# Patient Record
Sex: MALE | Race: WHITE | NOT HISPANIC OR LATINO | Employment: FULL TIME | URBAN - METROPOLITAN AREA
[De-identification: names, ages, dates, MRNs, and addresses within clinical notes are randomized per-mention and may not be internally consistent; named-entity substitution may affect disease eponyms.]

---

## 2017-01-10 ENCOUNTER — ALLSCRIPTS OFFICE VISIT (OUTPATIENT)
Dept: OTHER | Facility: OTHER | Age: 22
End: 2017-01-10

## 2017-02-02 ENCOUNTER — ALLSCRIPTS OFFICE VISIT (OUTPATIENT)
Dept: OTHER | Facility: OTHER | Age: 22
End: 2017-02-02

## 2017-11-10 ENCOUNTER — ALLSCRIPTS OFFICE VISIT (OUTPATIENT)
Dept: OTHER | Facility: OTHER | Age: 22
End: 2017-11-10

## 2017-11-13 NOTE — PROGRESS NOTES
Assessment    1  Lipoma of torso (214 1) (D17 1)    Discussion/Summary    Lipoma of torsotreatment plan with patient -due to the discomfort coming from the lipoma patient was referred to Dr Carimna Edwards  Patient instructed to talk to surgeon concerning length of time he would be out of work if lipoma removed  instructed to call for problems or concerns in the interim  The patient was counseled regarding instructions for management,-- risk factor reductions,-- impressions  Chief Complaint  LUMP /RT SIDE RIB AREA W DISCOMFORT      History of Present Illness  HPI: 25year old male presenting with with with a right flank lump that was noticed 2 months ago  Recently the lump has been causing him discomfort at work and also at rest  Patient describes the pain as being a pressure squeezing on his ribs  Patient has tried no treatments to alleviate discomfort  Review of Systems   Constitutional: no fever or chills, feels well, no tiredness, no recent weight loss or weight gain  ENT: no complaints of earache, no loss of hearing, no nosebleeds or nasal discharge, no sore throat or hoarseness  Cardiovascular: no complaints of slow or fast heart rate, no chest pain, no palpitations, no leg claudication or lower extremity edema  Respiratory: no complaints of shortness of breath, no wheezing or cough, no dyspnea on exertion, no orthopnea or PND  Gastrointestinal: no complaints of abdominal pain, no constipation, no nausea or vomiting, no diarrhea or bloody stools  Genitourinary: no complaints of dysuria or incontinence, no hesitancy, no nocturia, no genital lesion, no inadequacy of penile erection  Musculoskeletal: myalgias  Integumentary: small lump felt on back of right flank  Neurological: no complaints of headache, no confusion, no numbness or tingling, no dizziness or fainting  Active Problems  1  Acute bronchitis (466 0) (J20 9)   2  Acute costochondritis (733 6) (M94 0)   3   Acute esophagitis (530 12) (K20 9)   4  Acute otitis externa (380 10) (H60 509)   5  Acute upper respiratory infection (465 9) (J06 9)   6  Closed fracture of fourth metatarsal bone (825 25) (S92 343A)   7  Foot injury (959 7) (S99 929A)   8  Impetigo (684) (L01 00)   9  Sore throat (462) (J02 9)   10  Umbilical hernia (295 7) (K42 9)   11  URI (upper respiratory infection) (465 9) (J06 9)    Past Medical History  1  History of Acute lymphadenitis of upper limb (683) (L04 2)   2  History of Ankle pain, unspecified laterality   3  History of Chronic Primary Lymphadenitis (289 1)   4  History of Herpes simplex type 1 infection (054 9) (B00 9)   5  History of acute pharyngitis (V12 69) (Z87 09)   6  History of acute sinusitis (V12 69) (Z87 09)   7  History of atopic dermatitis (V13 3) (Z87 2)   8  History of influenza (V12 09) (Z87 09)   9  History of streptococcal pharyngitis (V12 09) (Z87 09)   10  History of tinea corporis (V12 09) (Z86 19)   11  History of viral warts (V12 09) (Z86 19)   12  History of Impetigo (684) (L01 00)   13  History of Intercostal pain (786 59) (R07 82)   14  History of Strain of muscle, fascia, or tendon of lower back (846 9) (S39 012A)   15  History of Wrist Injury (959 3)  Active Problems And Past Medical History Reviewed: The active problems and past medical history were reviewed and updated today  Family History  Mother    1  Family history of Breast Cancer (V16 3)  Maternal Grandmother    2  Family history of Diabetes Mellitus (V18 0)  Paternal Grandfather    3  Family history of Heart Disease (V17 49)   4  Family history of Hypertension (V17 49)  Family History    5  Family history of Cancer  Family History Reviewed: The family history was reviewed and updated today         Social History   · Denied: History of Alcohol Use (History)   · Denied: History of Daily Coffee Consumption (___ Cups/Day)   · Daily Cola Consumption (___ Cans/Day)   · Daily Tea Consumption (___ Cups/Day)   · Dental care, regularly   · Denied: History of Drug use   · Full-time employment   · High school graduate   · Marital History - Single   · Multiple organ donor (V59 9) (Z52 9)   · Never A Smoker   · No living will   · Occasionally uses seat belt   · Pets / animals   · Denied: History of Power of  in existence   · Sexually active  The social history was reviewed and updated today  The social history was reviewed and is unchanged  Surgical History  1  History of Axillary Lymphadenectomy   2  History of Hernia Repair  Surgical History Reviewed: The surgical history was reviewed and updated today  Current Meds    The medication list was reviewed and updated today  Allergies  1  No Known Drug Allergies    Vitals   Recorded: [id number] 08:06AM   Temperature 97 4 F   Heart Rate 76   Respiration 18   Systolic 506   Diastolic 80   Height 5 ft 11 in   Weight 286 lb 6 oz   BMI Calculated 39 94   BSA Calculated 2 46       Physical Exam   Constitutional  General appearance: No acute distress, well appearing and well nourished  -- obese male BMI 39 9  Eyes  Conjunctiva and lids: No swelling, erythema, or discharge  Pupils and irises: Equal, round and reactive to light  Pulmonary  Respiratory effort: No increased work of breathing or signs of respiratory distress  Auscultation of lungs: Clear to auscultation, equal breath sounds bilaterally, no wheezes, no rales, no rhonci  Cardiovascular  Auscultation of heart: Normal rate and rhythm, normal S1 and S2, without murmurs  Examination of extremities for edema and/or varicosities: Normal    Abdomen  Abdomen: Non-tender, no masses  Lymphatic  Palpation of lymph nodes in neck: No lymphadenopathy  Musculoskeletal  Digits and nails: Normal without clubbing or cyanosis  Skin  Skin and subcutaneous tissue: Abnormal  -- soft mobile round mass palpated between T8-T9 posterior region  Neurologic  Reflexes: 2+ and symmetric     Psychiatric  Orientation to person, place and time: Normal    Mood and affect: Normal          Signatures   Electronically signed by : Kae Salas DO; Nov 12 2017  6:29PM EST                       (Author)

## 2017-11-16 ENCOUNTER — GENERIC CONVERSION - ENCOUNTER (OUTPATIENT)
Dept: OTHER | Facility: OTHER | Age: 22
End: 2017-11-16

## 2018-01-13 VITALS
RESPIRATION RATE: 18 BRPM | SYSTOLIC BLOOD PRESSURE: 124 MMHG | DIASTOLIC BLOOD PRESSURE: 82 MMHG | TEMPERATURE: 97 F | HEART RATE: 80 BPM | HEIGHT: 71 IN | BODY MASS INDEX: 44.1 KG/M2 | WEIGHT: 315 LBS

## 2018-01-13 VITALS
DIASTOLIC BLOOD PRESSURE: 80 MMHG | SYSTOLIC BLOOD PRESSURE: 142 MMHG | TEMPERATURE: 97.4 F | HEART RATE: 76 BPM | BODY MASS INDEX: 40.09 KG/M2 | RESPIRATION RATE: 18 BRPM | WEIGHT: 286.38 LBS | HEIGHT: 71 IN

## 2018-01-14 VITALS
BODY MASS INDEX: 44.1 KG/M2 | TEMPERATURE: 97 F | HEIGHT: 71 IN | SYSTOLIC BLOOD PRESSURE: 118 MMHG | DIASTOLIC BLOOD PRESSURE: 82 MMHG | WEIGHT: 315 LBS | HEART RATE: 84 BPM

## 2018-02-26 ENCOUNTER — OFFICE VISIT (OUTPATIENT)
Dept: URGENT CARE | Age: 23
End: 2018-02-26
Payer: COMMERCIAL

## 2018-02-26 VITALS
BODY MASS INDEX: 30.06 KG/M2 | SYSTOLIC BLOOD PRESSURE: 120 MMHG | WEIGHT: 210 LBS | DIASTOLIC BLOOD PRESSURE: 78 MMHG | HEART RATE: 110 BPM | HEIGHT: 70 IN | RESPIRATION RATE: 24 BRPM | OXYGEN SATURATION: 94 % | TEMPERATURE: 101.3 F

## 2018-02-26 DIAGNOSIS — J11.1 INFLUENZA-LIKE ILLNESS: Primary | ICD-10-CM

## 2018-02-26 PROCEDURE — 99213 OFFICE O/P EST LOW 20 MIN: CPT | Performed by: PREVENTIVE MEDICINE

## 2018-02-26 RX ORDER — OSELTAMIVIR PHOSPHATE 75 MG/1
75 CAPSULE ORAL EVERY 12 HOURS SCHEDULED
Qty: 10 CAPSULE | Refills: 0 | Status: SHIPPED | OUTPATIENT
Start: 2018-02-26 | End: 2018-03-03

## 2018-02-26 NOTE — LETTER
February 26, 2018     Patient: Wilfredo Georges   YOB: 1995   Date of Visit: 2/26/2018       To Whom It May Concern:    Recommend no work until without fever for 24 hours without having to take anti fever medication  Patient seen in office today for acute illness       Sincerely,        Shandra Khoury PA-C    CC: No Recipients

## 2018-02-26 NOTE — PATIENT INSTRUCTIONS
Influenza, Ambulatory Care   GENERAL INFORMATION:   Influenza (the flu) is an infection caused by the influenza virus  The flu is easily spread when an infected person coughs, sneezes, or has close contact with others  You may be able to spread the flu to others for 1 week or longer after signs or symptoms appear  Common symptoms include the following:   · Fever and chills    · Headaches, body aches, and muscle or joint pain    · Cough, runny nose, and sore throat    · Loss of appetite, nausea, vomiting, or diarrhea    · Tiredness    · Trouble breathing  Seek immediate care for the following symptoms:   · Dizziness    · Urinating less or not at all    · A seizure    · A headache, stiff neck, and confusion    · Trouble breathing with blue or purple lips    · New pain or pressure in your chest  Treatment for influenza  may include any of the following:  · Acetaminophen  decreases pain and fever  It is available without a doctor's order  Ask your healthcare provider how much to take and how often to take it  Follow directions  Acetaminophen can cause liver damage if not taken correctly  · NSAIDs  help decrease swelling and pain or fever  This medicine is available with or without a doctor's order  NSAIDs can cause stomach bleeding or kidney problems in certain people  If you take blood thinner medicine, always ask your healthcare provider if NSAIDs are safe for you  Always read the medicine label and follow directions  · Antivirals  are given to fight an infection caused by a virus  Manage your symptoms:   · Get more rest and sleep  to help you get better faster when you have the flu  · Drink more liquids as directed  Ask your healthcare provider how much liquid to drink each day and which liquids are best for you  Drinking liquids helps prevent dehydration  Prevent the spread of influenza:   · Wash your hands often  Use soap and water  Use gel hand cleanser when there is no soap and water available   Do not touch your eyes, nose, or mouth unless you have washed your hands first            · Cover your mouth and nose with a tissue when you sneeze or cough  Throw the tissue in the trash right away  · Clean shared items  Clean table surfaces, doorknobs, and light switches with a germ-killing   Do not share towels, silverware, or dishes with people who are sick  Wash bed sheets, towels, silverware, and dishes with soap and water  · Wear a face mask  over your mouth and nose if you have the flu or are near anyone who has the flu  Ask where to buy single-use masks  · Stay home if you are sick  Stay away from others as much as possible while you recover  · Get an influenza vaccine  to help prevent the flu  Everyone older than age 7 months should get a yearly influenza vaccine  Get the vaccine as soon as it is available, usually in October or November each year  Follow up with your healthcare provider as directed:  Write down your questions so you remember to ask them during your visits  CARE AGREEMENT:   You have the right to help plan your care  Learn about your health condition and how it may be treated  Discuss treatment options with your caregivers to decide what care you want to receive  You always have the right to refuse treatment  The above information is an  only  It is not intended as medical advice for individual conditions or treatments  Talk to your doctor, nurse or pharmacist before following any medical regimen to see if it is safe and effective for you  © 2014 [address] is for End User's use only and may not be sold, redistributed or otherwise used for commercial purposes  All illustrations and images included in CareNotes® are the copyrighted property of [employer]  or [employer]

## 2018-02-26 NOTE — PROGRESS NOTES
[facility]        NAME: Jose Walters is a 25 y o  male  : 1995    MRN: [id number]  DATE: 2018  TIME: 6:21 PM    Assessment and Plan   Influenza-like illness [R69]  1  Influenza-like illness  oseltamivir (TAMIFLU) 75 mg capsule     Patient does not wish to have influenza testing done at this time  Patient Instructions     Patient Instructions     Influenza, Ambulatory Care   GENERAL INFORMATION:   Influenza (the flu) is an infection caused by the influenza virus  The flu is easily spread when an infected person coughs, sneezes, or has close contact with others  You may be able to spread the flu to others for 1 week or longer after signs or symptoms appear  Common symptoms include the following:   · Fever and chills    · Headaches, body aches, and muscle or joint pain    · Cough, runny nose, and sore throat    · Loss of appetite, nausea, vomiting, or diarrhea    · Tiredness    · Trouble breathing  Seek immediate care for the following symptoms:   · Dizziness    · Urinating less or not at all    · A seizure    · A headache, stiff neck, and confusion    · Trouble breathing with blue or purple lips    · New pain or pressure in your chest  Treatment for influenza  may include any of the following:  · Acetaminophen  decreases pain and fever  It is available without a doctor's order  Ask your healthcare provider how much to take and how often to take it  Follow directions  Acetaminophen can cause liver damage if not taken correctly  · NSAIDs  help decrease swelling and pain or fever  This medicine is available with or without a doctor's order  NSAIDs can cause stomach bleeding or kidney problems in certain people  If you take blood thinner medicine, always ask your healthcare provider if NSAIDs are safe for you  Always read the medicine label and follow directions  · Antivirals  are given to fight an infection caused by a virus    Manage your symptoms:   · Get more rest and sleep to help you get better faster when you have the flu  · Drink more liquids as directed  Ask your healthcare provider how much liquid to drink each day and which liquids are best for you  Drinking liquids helps prevent dehydration  Prevent the spread of influenza:   · Wash your hands often  Use soap and water  Use gel hand cleanser when there is no soap and water available  Do not touch your eyes, nose, or mouth unless you have washed your hands first            · Cover your mouth and nose with a tissue when you sneeze or cough  Throw the tissue in the trash right away  · Clean shared items  Clean table surfaces, doorknobs, and light switches with a germ-killing   Do not share towels, silverware, or dishes with people who are sick  Wash bed sheets, towels, silverware, and dishes with soap and water  · Wear a face mask  over your mouth and nose if you have the flu or are near anyone who has the flu  Ask where to buy single-use masks  · Stay home if you are sick  Stay away from others as much as possible while you recover  · Get an influenza vaccine  to help prevent the flu  Everyone older than age 7 months should get a yearly influenza vaccine  Get the vaccine as soon as it is available, usually in October or November each year  Follow up with your healthcare provider as directed:  Write down your questions so you remember to ask them during your visits  CARE AGREEMENT:   You have the right to help plan your care  Learn about your health condition and how it may be treated  Discuss treatment options with your caregivers to decide what care you want to receive  You always have the right to refuse treatment  The above information is an  only  It is not intended as medical advice for individual conditions or treatments  Talk to your doctor, nurse or pharmacist before following any medical regimen to see if it is safe and effective for you    © 2014 [address] [facility] is for End User's use only and may not be sold, redistributed or otherwise used for commercial purposes  All illustrations and images included in CareNotes® are the copyrighted property of [employer]  or [address]   Chief Complaint     Chief Complaint   Patient presents with    Cough         History of Present Illness   Aisha Naqvi presents to the clinic c/o    56-year-old male with fever chills cough chest discomfort that started last night  Taking Mucinex DM for cough suppression  Review of Systems   Review of Systems   Constitutional: Positive for activity change, appetite change, chills, diaphoresis, fatigue and fever  HENT: Positive for congestion, postnasal drip, rhinorrhea and sore throat  Negative for ear pain  Eyes: Negative  Respiratory: Positive for cough, chest tightness, shortness of breath and wheezing  Cardiovascular: Negative for chest pain, palpitations and leg swelling  Gastrointestinal: Negative  Musculoskeletal: Positive for myalgias  Negative for neck stiffness  Skin: Negative for rash  Neurological: Positive for headaches  Hematological: Negative for adenopathy  Current Medications     No long-term prescriptions on file  Current Allergies     Allergies as of 02/26/2018    (No Known Allergies)            The following portions of the patient's history were reviewed and updated as appropriate: allergies, current medications, past family history, past medical history, past social history, past surgical history and problem list     Objective   /78   Pulse (!) 110   Temp (!) 101 3 °F (38 5 °C) (Temporal)   Resp (!) 24   Ht 5' 10" (1 778 m)   Wt 95 3 kg (210 lb)   SpO2 94%   BMI 30 13 kg/m²        Physical Exam     Physical Exam   Constitutional: He is oriented to person, place, and time     Well-developed well-nourished male appears acutely ill but in no acute distress   HENT:   TMs EACs are normal  Cobblestoning of the posterior pharynx without redness  Uvula is midline  Eyes:   No conjunctival pallor or scleral icterus  Neck:   Supple without adenopathy or masses  Good range of motion  No nuchal rigidity  Cardiovascular:   Sinus tachycardic without murmurs rubs or gallops   Pulmonary/Chest:   Clear to auscultation without wheezes rales or rhonchi   Neurological: He is alert and oriented to person, place, and time  Skin: Skin is warm  No rash noted  No erythema  Psychiatric: He has a normal mood and affect  Nursing note and vitals reviewed

## 2018-03-30 ENCOUNTER — TELEPHONE (OUTPATIENT)
Dept: FAMILY MEDICINE CLINIC | Facility: CLINIC | Age: 23
End: 2018-03-30

## 2018-04-09 ENCOUNTER — OFFICE VISIT (OUTPATIENT)
Dept: FAMILY MEDICINE CLINIC | Facility: CLINIC | Age: 23
End: 2018-04-09
Payer: COMMERCIAL

## 2018-04-09 VITALS
HEIGHT: 70 IN | BODY MASS INDEX: 36.42 KG/M2 | WEIGHT: 254.4 LBS | DIASTOLIC BLOOD PRESSURE: 84 MMHG | HEART RATE: 80 BPM | SYSTOLIC BLOOD PRESSURE: 122 MMHG | TEMPERATURE: 97.5 F

## 2018-04-09 DIAGNOSIS — L73.2 HIDRADENITIS SUPPURATIVA OF LEFT AXILLA: Primary | ICD-10-CM

## 2018-04-09 PROCEDURE — 99213 OFFICE O/P EST LOW 20 MIN: CPT | Performed by: FAMILY MEDICINE

## 2018-04-09 PROCEDURE — 3008F BODY MASS INDEX DOCD: CPT | Performed by: FAMILY MEDICINE

## 2018-04-09 NOTE — PROGRESS NOTES
Patient ID: Noe Dawn is a 25 y o  male  HPI: 25 y o male presents with a recurrent abscess under his left axilla  It keeps getting inflammed and subsequently draining pustular material       SUBJECTIVE    No family history on file  Social History     Social History    Marital status: Single     Spouse name: N/A    Number of children: N/A    Years of education: N/A     Occupational History    Not on file  Social History Main Topics    Smoking status: Never Smoker    Smokeless tobacco: Never Used    Alcohol use Yes      Comment: social    Drug use: No    Sexual activity: Not on file     Other Topics Concern    Not on file     Social History Narrative    No narrative on file     Past Medical History:   Diagnosis Date    Melanoma ([facility] [address] )     back     Past Surgical History:   Procedure Laterality Date    UMBILICAL HERNIA REPAIR       No Known Allergies  No current outpatient prescriptions on file      Review of Systems  Constitutional:     Denies fever, chills ,fatigue ,weakness ,weight loss, weight gain     ENT: Denies earache ,loss of hearing ,nosebleed, nasal discharge,nasal congestion ,sore throat ,hoarseness  Pulmonary: Denies shortness of breath ,cough  ,dyspnea on exertion, orthopnea  ,PND   Cardiovascular:  Denies bradycardia , tachycardia  ,palpations, lower extremity edema leg, claudication  Breast:  Denies new or changing breast lumps ,nipple discharge ,nipple changes  Abdomen:  Denies abdominal pain , anorexia , indigestion, nausea, vomiting, constipation, diarrhea  Musculoskeletal: Denies myalgias, arthralgias, joint swelling, joint stiffness , limb pain, limb swelling  Gu: denies dysuria, polyuria  Skin:Erythematous left axillary mass approx 4 cm in diameter and fluctuant  Neuro: Denies headache, numbness, tingling, confusion, loss of consciousness, dizziness, vertigo  Psychiatric: Denies feelings of depression, suicidal ideation, anxiety, sleep disturbances    OBJECTIVE    Constitutional:   NAD, well appearing and well nourished      ENT:   Conjunctiva and lids: no injection, edema, or discharge     Pupils and iris: RUI bilaterally    External inspection of ears and nose: normal without deformities or discharge  Otoscopic exam: Canals patent without erythema  Nasal mucosa, septum and turbinates: Normal or edema or discharge         Oropharynx:  Moist mucosa, normal tongue and tonsils without lesions  No erythema        Pulmonary:Respiratory effort normal rate and rhythm, no increased work of breathing  Auscultation of lungs:  Clear bilaterally with no adventitious breath sounds       Cardiovascular: regular rate and rhythm, S1 and S2, no murmur, no edema and/or varicosities of LE      Abdomen: Soft and non-distended     Positive bowel sounds      No heptomegaly or splenomegaly      Gu: no suprapubic tenderness or CVA tenderness, no urethral discharge  Lymphatic:  No anterior or posterior cervical lymphadenopathy         Musculoskeletal:  Gait and station: Normal gait      Digits and nails normal without clubbing or cyanosis       Inspection/palpation of joints, bones, and muscles:  No joint tenderness, swelling, full active and passive range of motion       Skin: Normal skin turgor and no rashes      Neuro:    Normal reflexes     Psych:   alert and oriented to person, place and time     normal mood and affect       Assessment/Plan:Diagnoses and all orders for this visit:    Hidradenitis suppurativa of left axilla  -     Ambulatory referral to General Surgery; Future      Will await general surgery evaluation

## 2019-09-18 ENCOUNTER — OFFICE VISIT (OUTPATIENT)
Dept: FAMILY MEDICINE CLINIC | Facility: CLINIC | Age: 24
End: 2019-09-18
Payer: COMMERCIAL

## 2019-09-18 VITALS
BODY MASS INDEX: 41.8 KG/M2 | SYSTOLIC BLOOD PRESSURE: 122 MMHG | HEART RATE: 72 BPM | TEMPERATURE: 98.5 F | HEIGHT: 70 IN | DIASTOLIC BLOOD PRESSURE: 70 MMHG | WEIGHT: 292 LBS

## 2019-09-18 DIAGNOSIS — R53.83 OTHER FATIGUE: Primary | ICD-10-CM

## 2019-09-18 PROCEDURE — 3008F BODY MASS INDEX DOCD: CPT | Performed by: FAMILY MEDICINE

## 2019-09-18 PROCEDURE — 99213 OFFICE O/P EST LOW 20 MIN: CPT | Performed by: FAMILY MEDICINE

## 2019-09-25 ENCOUNTER — APPOINTMENT (OUTPATIENT)
Dept: LAB | Facility: HOSPITAL | Age: 24
End: 2019-09-25
Payer: COMMERCIAL

## 2019-09-25 DIAGNOSIS — R53.83 OTHER FATIGUE: ICD-10-CM

## 2019-09-25 LAB
ALBUMIN SERPL BCP-MCNC: 4.7 G/DL (ref 3.5–5)
ALP SERPL-CCNC: 65 U/L (ref 46–116)
ALT SERPL W P-5'-P-CCNC: 71 U/L (ref 12–78)
ANION GAP SERPL CALCULATED.3IONS-SCNC: 10 MMOL/L (ref 4–13)
AST SERPL W P-5'-P-CCNC: 42 U/L (ref 5–45)
BILIRUB SERPL-MCNC: 0.8 MG/DL (ref 0.2–1)
BUN SERPL-MCNC: 16 MG/DL (ref 5–25)
CALCIUM SERPL-MCNC: 9.8 MG/DL (ref 8.3–10.1)
CHLORIDE SERPL-SCNC: 103 MMOL/L (ref 100–108)
CO2 SERPL-SCNC: 27 MMOL/L (ref 21–32)
CREAT SERPL-MCNC: 1.07 MG/DL (ref 0.6–1.3)
ERYTHROCYTE [DISTWIDTH] IN BLOOD BY AUTOMATED COUNT: 12.5 % (ref 11.6–15.1)
GFR SERPL CREATININE-BSD FRML MDRD: 97 ML/MIN/1.73SQ M
GLUCOSE SERPL-MCNC: 84 MG/DL (ref 65–140)
HCT VFR BLD AUTO: 45.7 % (ref 36.5–49.3)
HGB BLD-MCNC: 15.6 G/DL (ref 12–17)
MCH RBC QN AUTO: 30.5 PG (ref 26.8–34.3)
MCHC RBC AUTO-ENTMCNC: 34.1 G/DL (ref 31.4–37.4)
MCV RBC AUTO: 89 FL (ref 82–98)
PLATELET # BLD AUTO: 305 THOUSANDS/UL (ref 149–390)
PMV BLD AUTO: 11.6 FL (ref 8.9–12.7)
POTASSIUM SERPL-SCNC: 3.9 MMOL/L (ref 3.5–5.3)
PROT SERPL-MCNC: 8.1 G/DL (ref 6.4–8.2)
RBC # BLD AUTO: 5.11 MILLION/UL (ref 3.88–5.62)
SODIUM SERPL-SCNC: 140 MMOL/L (ref 136–145)
TSH SERPL DL<=0.05 MIU/L-ACNC: 2.74 UIU/ML (ref 0.36–3.74)
WBC # BLD AUTO: 8.6 THOUSAND/UL (ref 4.31–10.16)

## 2019-09-25 PROCEDURE — 84443 ASSAY THYROID STIM HORMONE: CPT

## 2019-09-25 PROCEDURE — 86800 THYROGLOBULIN ANTIBODY: CPT

## 2019-09-25 PROCEDURE — 36415 COLL VENOUS BLD VENIPUNCTURE: CPT

## 2019-09-25 PROCEDURE — 85027 COMPLETE CBC AUTOMATED: CPT

## 2019-09-25 PROCEDURE — 80053 COMPREHEN METABOLIC PANEL: CPT

## 2019-09-26 LAB — THYROGLOB AB SERPL-ACNC: <1 IU/ML (ref 0–0.9)

## 2019-09-30 ENCOUNTER — APPOINTMENT (OUTPATIENT)
Dept: LAB | Facility: CLINIC | Age: 24
End: 2019-09-30
Payer: COMMERCIAL

## 2019-09-30 PROCEDURE — 84402 ASSAY OF FREE TESTOSTERONE: CPT

## 2019-09-30 PROCEDURE — 84403 ASSAY OF TOTAL TESTOSTERONE: CPT

## 2019-09-30 PROCEDURE — 36415 COLL VENOUS BLD VENIPUNCTURE: CPT

## 2019-10-01 LAB
TESTOST FREE SERPL-MCNC: 23.8 PG/ML (ref 9.3–26.5)
TESTOST SERPL-MCNC: 876 NG/DL (ref 264–916)

## 2019-10-10 ENCOUNTER — TELEPHONE (OUTPATIENT)
Dept: FAMILY MEDICINE CLINIC | Facility: CLINIC | Age: 24
End: 2019-10-10

## 2019-12-20 NOTE — PROGRESS NOTES
BMI Counseling: Body mass index is 41 9 kg/m²  The BMI is above normal  Nutrition recommendations include reducing portion sizes, decreasing overall calorie intake and 3-5 servings of fruits/vegetables daily  Exercise recommendations include exercising 3-5 times per week

## 2019-12-30 ENCOUNTER — OFFICE VISIT (OUTPATIENT)
Dept: FAMILY MEDICINE CLINIC | Facility: CLINIC | Age: 24
End: 2019-12-30
Payer: COMMERCIAL

## 2019-12-30 VITALS
HEIGHT: 70 IN | DIASTOLIC BLOOD PRESSURE: 80 MMHG | WEIGHT: 291 LBS | BODY MASS INDEX: 41.66 KG/M2 | TEMPERATURE: 99.9 F | SYSTOLIC BLOOD PRESSURE: 132 MMHG | HEART RATE: 74 BPM

## 2019-12-30 DIAGNOSIS — J20.9 ACUTE BRONCHITIS, UNSPECIFIED ORGANISM: Primary | ICD-10-CM

## 2019-12-30 PROCEDURE — 99213 OFFICE O/P EST LOW 20 MIN: CPT | Performed by: FAMILY MEDICINE

## 2019-12-30 PROCEDURE — 3008F BODY MASS INDEX DOCD: CPT | Performed by: FAMILY MEDICINE

## 2019-12-30 RX ORDER — DOXYCYCLINE HYCLATE 100 MG/1
100 CAPSULE ORAL EVERY 12 HOURS SCHEDULED
Qty: 20 CAPSULE | Refills: 0 | Status: SHIPPED | OUTPATIENT
Start: 2019-12-30 | End: 2020-01-09

## 2019-12-30 RX ORDER — GUAIFENESIN AND CODEINE PHOSPHATE 100; 10 MG/5ML; MG/5ML
10 SOLUTION ORAL 3 TIMES DAILY PRN
Qty: 240 ML | Refills: 1 | Status: SHIPPED | OUTPATIENT
Start: 2019-12-30 | End: 2021-12-02 | Stop reason: ALTCHOICE

## 2019-12-30 RX ORDER — PREDNISONE 10 MG/1
TABLET ORAL
Qty: 26 TABLET | Refills: 0 | Status: SHIPPED | OUTPATIENT
Start: 2019-12-30 | End: 2020-04-13 | Stop reason: ALTCHOICE

## 2019-12-30 NOTE — LETTER
December 30, 2019     Patient: Rachael Espana   YOB: 1995   Date of Visit: 12/30/2019       To Whom it May Concern:    Gricel Sy is under my professional care  He was seen in my office on 12/30/2019  He may return to work on 12/31/19  If you have any questions or concerns, please don't hesitate to call           Sincerely,          Francesco Cortés DO        CC: No Recipients

## 2019-12-30 NOTE — LETTER
December 30, 2019     Patient: Remigio Dakin   YOB: 1995   Date of Visit: 12/30/2019       To Whom it May Concern:    Charity Fregoso is under my professional care  He was seen in my office on 12/30/2019  He may return to work on 1/2/20  If you have any questions or concerns, please don't hesitate to call           Sincerely,          Phillip Hidden, DO        CC: No Recipients

## 2019-12-31 NOTE — PROGRESS NOTES
Patient ID: Josephine Craft is a 22 y o  male  HPI: 22 y o male presenting with symptoms of chest congestion, cough and wheezing  Symptoms worsening over past 4 days        SUBJECTIVE    Family History   Problem Relation Age of Onset    Breast cancer Mother     Diabetes Maternal Grandmother     Heart disease Paternal Grandfather     Hypertension Paternal Grandfather     Cancer Family      Social History     Socioeconomic History    Marital status: Single     Spouse name: Not on file    Number of children: Not on file    Years of education: Not on file    Highest education level: Not on file   Occupational History    Not on file   Social Needs    Financial resource strain: Not on file    Food insecurity:     Worry: Not on file     Inability: Not on file    Transportation needs:     Medical: Not on file     Non-medical: Not on file   Tobacco Use    Smoking status: Never Smoker    Smokeless tobacco: Never Used   Substance and Sexual Activity    Alcohol use: Yes     Comment: social    Drug use: No    Sexual activity: Not on file   Lifestyle    Physical activity:     Days per week: Not on file     Minutes per session: Not on file    Stress: Not on file   Relationships    Social connections:     Talks on phone: Not on file     Gets together: Not on file     Attends Sabianism service: Not on file     Active member of club or organization: Not on file     Attends meetings of clubs or organizations: Not on file     Relationship status: Not on file    Intimate partner violence:     Fear of current or ex partner: Not on file     Emotionally abused: Not on file     Physically abused: Not on file     Forced sexual activity: Not on file   Other Topics Concern    Not on file   Social History Narrative    Not on file     Past Medical History:   Diagnosis Date    Herpes simplex type 1 infection     last assessed 04/07/2014    Lymphadenitis, chronic     resolved 04/07/2014    Melanoma ([facility] [address] )     back    Viral warts     last assessed 04/07/14     Past Surgical History:   Procedure Laterality Date    MASTECTOMY PARTIAL / LUMPECTOMY W/ AXILLARY LYMPHADENECTOMY      resolved 55/51/5468    UMBILICAL HERNIA REPAIR       No Known Allergies    Current Outpatient Medications:     doxycycline hyclate (VIBRAMYCIN) 100 mg capsule, Take 1 capsule (100 mg total) by mouth every 12 (twelve) hours for 10 days, Disp: 20 capsule, Rfl: 0    guaifenesin-codeine (GUAIFENESIN AC) 100-10 MG/5ML liquid, Take 10 mL by mouth 3 (three) times a day as needed for cough, Disp: 240 mL, Rfl: 1    predniSONE 10 mg tablet, 3 tabs po bid x2 days, then 2 tabs po bid x2 days, then 1 tab bid x2 days, then 1 daily until done , Disp: 26 tablet, Rfl: 0    Review of Systems    Constitutional:     Denies fever, chills, fatigue, weakness ,weight loss, weight gain     ENT: Denies earache, loss of hearing, nosebleed, nasal discharge,nasal congestion, sore throat,hoarseness  Pulmonary: Denies shortness of breath , dyspnea on exertion, orthopnea ,but complains of cough, wheezing and pnd    Cardiovascular:  Denies bradycardia , tachycardia ,palpations, lower extremity, edema leg, claudication  Breast:  Denies new or changing breast lumps,  nipple discharge, nipple changes,  Abdomen:  Denies abdominal pain , anorexia ,indigestion, nausea ,vomiting, constipation , diarrhea  Musculoskeletal: Denies myalgias, arthralgias, joint swelling, joint stiffness ,limb pain, limb swelling  Lymph: denies swollen glands  Gu: no dysuria or urinary frequency  Skin: Denies skin rash, skin lesion, skin wound, itching,dry skin  Neuro: Denies headache, numbness, tingling, confusion, loss of consciousness, dizziness ,vertigo  Psychiatric: Denies feelings of depression, suicidal ideation, anxiety, sleep disturbances    OBJECTIVE  /80   Pulse 74   Temp 99 9 °F (37 7 °C)   Ht 5' 10" (1 778 m)   Wt 132 kg (291 lb)   BMI 41 75 kg/m²   Constitutional:   NAD, well appearing and well nourished      ENT:   Conjunctiva and lids: no injection, edema, or discharge     Pupils and iris: RUI bilaterally    External inspection of ears and nose: normal without deformities or discharge  Otoscopic exam: Canals patent without erythema; tm are dull and erythematous bilaterally       Nasal mucosa, septum and turbinates: Turbinae injection with discharge   Oropharynx:  Moist mucosa, normal tongue and tonsils without lesions  Erythema and injection  of post pharynx with pnd     Pulmonary:Respiratory effort normal rate and rhythm, no increased work of breathing  Auscultation of lungs:  Scattered rhonchi and expiratory wheezes bilaterally      Cardiovascular: regular rate and rhythm, S1 and S2, no murmur, no edema and/or varicosities of LE      Abdomen: Soft and non-distended    Positive bowel sounds    No heptomegaly or splenomegaly    Gu: no suprapubic tenderness, no CVA tenderness, or urethral discharge  Lymphatic: Anterior cervical lymphadenopathy     Muscskeletal:  Gait and station: Normal gait     Digits and nails normal without clubbing or cyanosis      Inspection/palpation of joints, bones, and muscles:  No joint tenderness, swelling, full active and passive range of motion  Skin: Normal skin turgor and no rashes      Neuro:    Normal reflexes       Psych:   alert and oriented to person, place and time     normal mood and affect       Assessment/Plan:Diagnoses and all orders for this visit:    Acute bronchitis, unspecified organism  -     predniSONE 10 mg tablet; 3 tabs po bid x2 days, then 2 tabs po bid x2 days, then 1 tab bid x2 days, then 1 daily until done   -     guaifenesin-codeine (GUAIFENESIN AC) 100-10 MG/5ML liquid; Take 10 mL by mouth 3 (three) times a day as needed for cough  -     doxycycline hyclate (VIBRAMYCIN) 100 mg capsule; Take 1 capsule (100 mg total) by mouth every 12 (twelve) hours for 10 days        Reviewed with patient plan to treat with above plan      Patient instructed to call in 72 hours if not feeling better or if symptoms worsen h

## 2020-01-01 NOTE — TELEPHONE ENCOUNTER
1015 Monday am
Would like to see you or NP Monday, has pussy cyst under armpit 
positive blood return obtained via catheter/all materials/supplies accounted for at end of procedure/location identified, draped/prepped, sterile technique used, needle inserted/introduced
location identified, draped/prepped, sterile technique used/supine position/sterile dressing applied/sterile technique, catheter placed

## 2020-01-07 ENCOUNTER — TELEPHONE (OUTPATIENT)
Dept: GASTROENTEROLOGY | Facility: CLINIC | Age: 25
End: 2020-01-07

## 2020-04-13 ENCOUNTER — TELEMEDICINE (OUTPATIENT)
Dept: FAMILY MEDICINE CLINIC | Facility: CLINIC | Age: 25
End: 2020-04-13
Payer: COMMERCIAL

## 2020-04-13 ENCOUNTER — TELEPHONE (OUTPATIENT)
Dept: FAMILY MEDICINE CLINIC | Facility: CLINIC | Age: 25
End: 2020-04-13

## 2020-04-13 DIAGNOSIS — S76.112A STRAIN OF LEFT QUADRICEPS, INITIAL ENCOUNTER: Primary | ICD-10-CM

## 2020-04-13 PROCEDURE — 99213 OFFICE O/P EST LOW 20 MIN: CPT | Performed by: FAMILY MEDICINE

## 2020-04-13 PROCEDURE — 1036F TOBACCO NON-USER: CPT | Performed by: FAMILY MEDICINE

## 2020-04-13 RX ORDER — METHOCARBAMOL 500 MG/1
500 TABLET, FILM COATED ORAL 3 TIMES DAILY
Qty: 30 TABLET | Refills: 0 | Status: SHIPPED | OUTPATIENT
Start: 2020-04-13 | End: 2021-12-02 | Stop reason: ALTCHOICE

## 2021-02-11 ENCOUNTER — TELEPHONE (OUTPATIENT)
Dept: FAMILY MEDICINE CLINIC | Facility: CLINIC | Age: 26
End: 2021-02-11

## 2021-02-11 DIAGNOSIS — B35.4 TINEA CORPORIS: Primary | ICD-10-CM

## 2021-02-11 RX ORDER — KETOCONAZOLE 20 MG/G
CREAM TOPICAL DAILY
Qty: 30 G | Refills: 3 | Status: SHIPPED | OUTPATIENT
Start: 2021-02-11 | End: 2021-12-02 | Stop reason: ALTCHOICE

## 2021-02-11 NOTE — TELEPHONE ENCOUNTER
Patient called stating he needs a refill on his cream you prescribe him for his skin disease? He could not remember the name of the cream but he said you have been giving it to him for about 7 years now       James EDWARDS

## 2021-07-18 ENCOUNTER — OFFICE VISIT (OUTPATIENT)
Dept: URGENT CARE | Facility: MEDICAL CENTER | Age: 26
End: 2021-07-18
Payer: COMMERCIAL

## 2021-07-18 VITALS
OXYGEN SATURATION: 97 % | WEIGHT: 315 LBS | BODY MASS INDEX: 45.1 KG/M2 | HEIGHT: 70 IN | DIASTOLIC BLOOD PRESSURE: 79 MMHG | TEMPERATURE: 97.8 F | HEART RATE: 104 BPM | RESPIRATION RATE: 18 BRPM | SYSTOLIC BLOOD PRESSURE: 143 MMHG

## 2021-07-18 DIAGNOSIS — L05.01 PILONIDAL ABSCESS: Primary | ICD-10-CM

## 2021-07-18 PROCEDURE — 99213 OFFICE O/P EST LOW 20 MIN: CPT | Performed by: PHYSICIAN ASSISTANT

## 2021-07-18 PROCEDURE — 10080 I&D PILONIDAL CYST SIMPLE: CPT | Performed by: PHYSICIAN ASSISTANT

## 2021-07-18 RX ORDER — CEPHALEXIN 500 MG/1
500 CAPSULE ORAL EVERY 6 HOURS SCHEDULED
Qty: 28 CAPSULE | Refills: 0 | Status: SHIPPED | OUTPATIENT
Start: 2021-07-18 | End: 2021-07-25

## 2021-07-18 NOTE — PROGRESS NOTES
[facility]        NAME: Dae Damico is a 26 y o  male  : 1995    MRN: [id number]  DATE: 2021  TIME: 4:32 PM    Assessment and Plan   Pilonidal abscess [L05 01]  1  Pilonidal abscess  cephalexin (KEFLEX) 500 mg capsule         Patient Instructions     Pilonidal abscess  Keflex as directed  Return in 24 hours for packing removal  Follow up with PCP in 3-5 days  Proceed to  ER if symptoms worsen  Chief Complaint     Chief Complaint   Patient presents with    Abscess     taibone for the past 3 days  History of Present Illness       25 y/o male presents c/o pain and swelling to buttock area x 4 days  States he tried warm compresses with no improvement      Review of Systems   Review of Systems   Constitutional: Negative  HENT: Negative  Eyes: Negative  Respiratory: Negative  Negative for apnea, cough, choking, chest tightness, shortness of breath, wheezing and stridor  Cardiovascular: Negative  Negative for chest pain  Skin: Positive for wound           Current Medications       Current Outpatient Medications:     cephalexin (KEFLEX) 500 mg capsule, Take 1 capsule (500 mg total) by mouth every 6 (six) hours for 7 days, Disp: 28 capsule, Rfl: 0    guaifenesin-codeine (GUAIFENESIN AC) 100-10 MG/5ML liquid, Take 10 mL by mouth 3 (three) times a day as needed for cough (Patient not taking: Reported on 2021), Disp: 240 mL, Rfl: 1    ketoconazole (NIZORAL) 2 % cream, Apply topically daily (Patient not taking: Reported on 2021), Disp: 30 g, Rfl: 3    methocarbamol (ROBAXIN) 500 mg tablet, Take 1 tablet (500 mg total) by mouth 3 (three) times a day for 10 days, Disp: 30 tablet, Rfl: 0    oxaprozin (DAYPRO) 600 MG tablet, Take 1 tablet (600 mg total) by mouth 2 (two) times a day for 10 days, Disp: 20 tablet, Rfl: 0    Current Allergies     Allergies as of 2021    (No Known Allergies)            The following portions of the patient's history were reviewed and updated as appropriate: allergies, current medications, past family history, past medical history, past social history, past surgical history and problem list      Past Medical History:   Diagnosis Date    Herpes simplex type 1 infection     last assessed 04/07/2014    Lymphadenitis, chronic     resolved 04/07/2014    Melanoma ([facility] [address] )     back    Viral warts     last assessed 04/07/14       Past Surgical History:   Procedure Laterality Date    MASTECTOMY PARTIAL / LUMPECTOMY W/ AXILLARY LYMPHADENECTOMY      resolved 91/26/7946    UMBILICAL HERNIA REPAIR         Family History   Problem Relation Age of Onset    Breast cancer Mother     Diabetes Maternal Grandmother     Heart disease Paternal Grandfather     Hypertension Paternal Grandfather     Cancer Family          Medications have been verified  Objective   /79   Pulse 104   Temp 97 8 °F (36 6 °C)   Resp 18   Ht 5' 10" (1 778 m)   Wt (!) 143 kg (316 lb)   SpO2 97%   BMI 45 34 kg/m²        Physical Exam     Physical Exam  Constitutional:       General: He is not in acute distress  Appearance: He is well-developed  He is not diaphoretic  Cardiovascular:      Rate and Rhythm: Normal rate and regular rhythm  Heart sounds: Normal heart sounds  Pulmonary:      Effort: Pulmonary effort is normal  No respiratory distress  Breath sounds: Normal breath sounds  No wheezing or rales  Chest:      Chest wall: No tenderness  Musculoskeletal:      Cervical back: Normal range of motion and neck supple  Lymphadenopathy:      Cervical: No cervical adenopathy     Skin:                       Incision and drain    Date/Time: 7/18/2021 4:36 PM  Performed by: Jeane Lombardi PA-C  Authorized by: Jeane Lombardi PA-C   Universal Protocol:  Risks and benefits: risks, benefits and alternatives were discussed  Consent given by: patient  Patient understanding: patient states understanding of the procedure being performed  Patient identity confirmed: verbally with patient      Patient location:  Clinic  Location:     Type:  Abscess    Location:  Anogenital    Anogenital location:  Pilonidal  Pre-procedure details:     Skin preparation:  Antiseptic wash and Betadine  Anesthesia (see MAR for exact dosages): Anesthesia method:  Local infiltration    Local anesthetic:  Lidocaine 1% w/o epi  Procedure details:     Complexity:  Simple    Needle aspiration: no      Incision types:  Stab incision    Scalpel blade:  11    Approach:  Open    Incision depth:  Subcutaneous    Wound management:  Probed and deloculated, irrigated with saline, extensive cleaning and debrided    Drainage:  Purulent    Drainage amount:  Copious    Wound treatment:  Wound left open and packing placed    Packing materials:  1/4 in iodoform gauze  Post-procedure details:     Patient tolerance of procedure:   Tolerated well, no immediate complications

## 2021-07-18 NOTE — PATIENT INSTRUCTIONS
Pilonidal abscess  Keflex as directed  Return in 24 hours for packing removal  Follow up with PCP in 3-5 days  Proceed to  ER if symptoms worsenPilonidal Cyst   WHAT YOU NEED TO KNOW:   A pilonidal cyst is a small sac under the skin  Pilonidal cysts may become infected and cause an abscess (collection of pus)  DISCHARGE INSTRUCTIONS:   Contact your healthcare provider if:   · You have a fever  · Your cyst is red and swollen  · Your cyst has pus draining from it  · You have questions or concerns about your condition or care  Prevent an infection:   · Shave around the cyst   This will prevent hairs from entering the cyst  Your healthcare provider may recommend laser hair removal      · Clean the cyst area as directed  Your healthcare provider may recommend that you use a mild soap and rinse it well  · Do not sit for long periods of time  This may cause the cyst to get irritated  Follow up with your healthcare provider as directed:  Write down your questions so you remember to ask them during your visits  © Copyright [address] Information is for End User's use only and may not be sold, redistributed or otherwise used for commercial purposes  All illustrations and images included in CareNotes® are the copyrighted property of A D A M , Inc  or [address] [place] [address] José   The above information is an  only  It is not intended as medical advice for individual conditions or treatments  Talk to your doctor, nurse or pharmacist before following any medical regimen to see if it is safe and effective for you

## 2021-11-23 ENCOUNTER — TELEPHONE (OUTPATIENT)
Dept: FAMILY MEDICINE CLINIC | Facility: CLINIC | Age: 26
End: 2021-11-23

## 2021-12-02 ENCOUNTER — OFFICE VISIT (OUTPATIENT)
Dept: FAMILY MEDICINE CLINIC | Facility: CLINIC | Age: 26
End: 2021-12-02
Payer: COMMERCIAL

## 2021-12-02 VITALS
HEIGHT: 70 IN | WEIGHT: 315 LBS | SYSTOLIC BLOOD PRESSURE: 124 MMHG | HEART RATE: 82 BPM | TEMPERATURE: 98.6 F | BODY MASS INDEX: 45.1 KG/M2 | DIASTOLIC BLOOD PRESSURE: 82 MMHG

## 2021-12-02 DIAGNOSIS — Z00.00 ANNUAL PHYSICAL EXAM: Primary | ICD-10-CM

## 2021-12-02 DIAGNOSIS — G47.00 INSOMNIA, UNSPECIFIED TYPE: ICD-10-CM

## 2021-12-02 DIAGNOSIS — R53.83 OTHER FATIGUE: ICD-10-CM

## 2021-12-02 DIAGNOSIS — E78.00 HYPERCHOLESTEROLEMIA: ICD-10-CM

## 2021-12-02 DIAGNOSIS — F90.9 ATTENTION DEFICIT HYPERACTIVITY DISORDER (ADHD), UNSPECIFIED ADHD TYPE: ICD-10-CM

## 2021-12-02 PROCEDURE — 3725F SCREEN DEPRESSION PERFORMED: CPT | Performed by: FAMILY MEDICINE

## 2021-12-02 PROCEDURE — 3008F BODY MASS INDEX DOCD: CPT | Performed by: FAMILY MEDICINE

## 2021-12-02 PROCEDURE — 99214 OFFICE O/P EST MOD 30 MIN: CPT | Performed by: FAMILY MEDICINE

## 2021-12-02 PROCEDURE — 1036F TOBACCO NON-USER: CPT | Performed by: FAMILY MEDICINE

## 2021-12-02 RX ORDER — DEXTROAMPHETAMINE SACCHARATE, AMPHETAMINE ASPARTATE MONOHYDRATE, DEXTROAMPHETAMINE SULFATE AND AMPHETAMINE SULFATE 5; 5; 5; 5 MG/1; MG/1; MG/1; MG/1
20 CAPSULE, EXTENDED RELEASE ORAL EVERY MORNING
Qty: 30 CAPSULE | Refills: 0 | Status: SHIPPED | OUTPATIENT
Start: 2021-12-02 | End: 2021-12-30

## 2021-12-02 RX ORDER — ZOLPIDEM TARTRATE 12.5 MG/1
12.5 TABLET, FILM COATED, EXTENDED RELEASE ORAL
Qty: 30 TABLET | Refills: 0 | Status: SHIPPED | OUTPATIENT
Start: 2021-12-02 | End: 2021-12-30 | Stop reason: SDUPTHER

## 2021-12-03 ENCOUNTER — APPOINTMENT (OUTPATIENT)
Dept: LAB | Facility: MEDICAL CENTER | Age: 26
End: 2021-12-03
Payer: COMMERCIAL

## 2021-12-03 DIAGNOSIS — E78.00 HYPERCHOLESTEROLEMIA: ICD-10-CM

## 2021-12-03 DIAGNOSIS — R53.83 OTHER FATIGUE: ICD-10-CM

## 2021-12-03 LAB
ALBUMIN SERPL BCP-MCNC: 4.1 G/DL (ref 3.5–5)
ALP SERPL-CCNC: 51 U/L (ref 46–116)
ALT SERPL W P-5'-P-CCNC: 135 U/L (ref 12–78)
ANION GAP SERPL CALCULATED.3IONS-SCNC: 8 MMOL/L (ref 4–13)
AST SERPL W P-5'-P-CCNC: 53 U/L (ref 5–45)
BASOPHILS # BLD AUTO: 0.04 THOUSANDS/ΜL (ref 0–0.1)
BASOPHILS NFR BLD AUTO: 1 % (ref 0–1)
BILIRUB SERPL-MCNC: 0.95 MG/DL (ref 0.2–1)
BUN SERPL-MCNC: 15 MG/DL (ref 5–25)
CALCIUM SERPL-MCNC: 9.3 MG/DL (ref 8.3–10.1)
CHLORIDE SERPL-SCNC: 105 MMOL/L (ref 100–108)
CHOLEST SERPL-MCNC: 181 MG/DL
CO2 SERPL-SCNC: 25 MMOL/L (ref 21–32)
CREAT SERPL-MCNC: 1.19 MG/DL (ref 0.6–1.3)
EOSINOPHIL # BLD AUTO: 0.1 THOUSAND/ΜL (ref 0–0.61)
EOSINOPHIL NFR BLD AUTO: 2 % (ref 0–6)
ERYTHROCYTE [DISTWIDTH] IN BLOOD BY AUTOMATED COUNT: 13 % (ref 11.6–15.1)
GFR SERPL CREATININE-BSD FRML MDRD: 84 ML/MIN/1.73SQ M
GLUCOSE P FAST SERPL-MCNC: 90 MG/DL (ref 65–99)
HCT VFR BLD AUTO: 48.3 % (ref 36.5–49.3)
HDLC SERPL-MCNC: 51 MG/DL
HGB BLD-MCNC: 16.4 G/DL (ref 12–17)
IMM GRANULOCYTES # BLD AUTO: 0.02 THOUSAND/UL (ref 0–0.2)
IMM GRANULOCYTES NFR BLD AUTO: 0 % (ref 0–2)
LDLC SERPL CALC-MCNC: 116 MG/DL (ref 0–100)
LYMPHOCYTES # BLD AUTO: 1.53 THOUSANDS/ΜL (ref 0.6–4.47)
LYMPHOCYTES NFR BLD AUTO: 27 % (ref 14–44)
MCH RBC QN AUTO: 30.6 PG (ref 26.8–34.3)
MCHC RBC AUTO-ENTMCNC: 34 G/DL (ref 31.4–37.4)
MCV RBC AUTO: 90 FL (ref 82–98)
MONOCYTES # BLD AUTO: 0.58 THOUSAND/ΜL (ref 0.17–1.22)
MONOCYTES NFR BLD AUTO: 10 % (ref 4–12)
NEUTROPHILS # BLD AUTO: 3.35 THOUSANDS/ΜL (ref 1.85–7.62)
NEUTS SEG NFR BLD AUTO: 60 % (ref 43–75)
NRBC BLD AUTO-RTO: 0 /100 WBCS
PLATELET # BLD AUTO: 328 THOUSANDS/UL (ref 149–390)
PMV BLD AUTO: 10.8 FL (ref 8.9–12.7)
POTASSIUM SERPL-SCNC: 4 MMOL/L (ref 3.5–5.3)
PROT SERPL-MCNC: 7.8 G/DL (ref 6.4–8.2)
RBC # BLD AUTO: 5.36 MILLION/UL (ref 3.88–5.62)
SODIUM SERPL-SCNC: 138 MMOL/L (ref 136–145)
TRIGL SERPL-MCNC: 72 MG/DL
TSH SERPL DL<=0.05 MIU/L-ACNC: 1.58 UIU/ML (ref 0.36–3.74)
WBC # BLD AUTO: 5.62 THOUSAND/UL (ref 4.31–10.16)

## 2021-12-03 PROCEDURE — 80061 LIPID PANEL: CPT

## 2021-12-03 PROCEDURE — 36415 COLL VENOUS BLD VENIPUNCTURE: CPT

## 2021-12-03 PROCEDURE — 80053 COMPREHEN METABOLIC PANEL: CPT

## 2021-12-03 PROCEDURE — 84432 ASSAY OF THYROGLOBULIN: CPT

## 2021-12-03 PROCEDURE — 84443 ASSAY THYROID STIM HORMONE: CPT

## 2021-12-03 PROCEDURE — 86800 THYROGLOBULIN ANTIBODY: CPT

## 2021-12-03 PROCEDURE — 85025 COMPLETE CBC W/AUTO DIFF WBC: CPT

## 2021-12-04 LAB
THYROGLOB AB SERPL-ACNC: <1 IU/ML (ref 0–0.9)
THYROGLOB SERPL-MCNC: 25 NG/ML (ref 1.4–29.2)

## 2022-01-03 ENCOUNTER — TELEPHONE (OUTPATIENT)
Dept: FAMILY MEDICINE CLINIC | Facility: CLINIC | Age: 27
End: 2022-01-03

## 2022-01-03 NOTE — TELEPHONE ENCOUNTER
----- Message from Jh Pastor sent at 12/27/2021  2:06 PM EST -----  Regarding: FW: How its going    ----- Message -----  From: Kamini Graham  Sent: 12/27/2021   2:04 PM EST  To: [id number] [facility]  Subject: How its going                                   Okay, I will schedule that  Do I have to  any paper work for other tests? I dont know what she will want to do about the adderall and sleeping pills  I still am not sleeping well, and the adderall just does not seem to last long enough for the day  I do see a big difference when it does work

## 2022-01-06 NOTE — TELEPHONE ENCOUNTER
Pt called back today because he saw we had tried to contact him on Monday  Asking if he can have a virtual appt or walk in appt to see you to discuss his meds and some other issues he is having

## 2022-01-07 ENCOUNTER — TELEMEDICINE (OUTPATIENT)
Dept: FAMILY MEDICINE CLINIC | Facility: CLINIC | Age: 27
End: 2022-01-07
Payer: COMMERCIAL

## 2022-01-07 DIAGNOSIS — G47.00 INSOMNIA, UNSPECIFIED TYPE: ICD-10-CM

## 2022-01-07 DIAGNOSIS — F90.9 ATTENTION DEFICIT HYPERACTIVITY DISORDER (ADHD), UNSPECIFIED ADHD TYPE: Primary | ICD-10-CM

## 2022-01-07 PROCEDURE — 99213 OFFICE O/P EST LOW 20 MIN: CPT | Performed by: FAMILY MEDICINE

## 2022-01-07 PROCEDURE — 1036F TOBACCO NON-USER: CPT | Performed by: FAMILY MEDICINE

## 2022-01-07 RX ORDER — DEXTROAMPHETAMINE SACCHARATE, AMPHETAMINE ASPARTATE, DEXTROAMPHETAMINE SULFATE AND AMPHETAMINE SULFATE 2.5; 2.5; 2.5; 2.5 MG/1; MG/1; MG/1; MG/1
10 TABLET ORAL
Qty: 30 TABLET | Refills: 0 | Status: SHIPPED | OUTPATIENT
Start: 2022-01-07 | End: 2022-01-14

## 2022-01-07 RX ORDER — TEMAZEPAM 15 MG/1
15 CAPSULE ORAL
Qty: 10 CAPSULE | Refills: 0 | Status: SHIPPED | OUTPATIENT
Start: 2022-01-07

## 2022-01-12 NOTE — PROGRESS NOTES
Virtual Regular Visit    Verification of patient location:    Patient is located in the following state in which I hold an active license [place]      Assessment/Plan:    Problem List Items Addressed This Visit     None      Visit Diagnoses     Attention deficit hyperactivity disorder (ADHD), unspecified ADHD type    -  Primary    Relevant Medications    amphetamine-dextroamphetamine (ADDERALL, 10MG,) 10 mg tablet    temazepam (RESTORIL) 15 mg capsule    Insomnia, unspecified type        Relevant Medications    temazepam (RESTORIL) 15 mg capsule               Reason for visit is No chief complaint on file  Encounter provider Maribell Carrasco DO    Provider located at [address]      Recent Visits  Date Type Provider Dept   01/07/22 [address] recent visits within past 7 days and meeting all other requirements  Future Appointments  No visits were found meeting these conditions  Showing future appointments within next 150 days and meeting all other requirements       The patient was identified by name and date of birth  Joaquim Aldrich was informed that this is a telemedicine visit and that the visit is being conducted through [address] and patient was informed this is a secure, HIPAA-complaint platform  He agrees to proceed     My office door was closed  No one else was in the room  He acknowledged consent and understanding of privacy and security of the video platform  The patient has agreed to participate and understands they can discontinue the visit at any time  Patient is aware this is a billable service  Buzz Long is a 23 y o  male is attempting to get his ADD under control  He states med wears off around 2 pm    Up until that point his focus is good  He c/o insomnia and did not respond to [employer]    He has trouble falling and staying asleep  HPI     Past Medical History:   Diagnosis Date    Herpes simplex type 1 infection     last assessed 04/07/2014    Lymphadenitis, chronic     resolved 04/07/2014    Melanoma ([facility] [address] )     back    Viral warts     last assessed 04/07/14       Past Surgical History:   Procedure Laterality Date    MASTECTOMY PARTIAL / LUMPECTOMY W/ AXILLARY LYMPHADENECTOMY      resolved 48/95/0153    UMBILICAL HERNIA REPAIR         Current Outpatient Medications   Medication Sig Dispense Refill    amphetamine-dextroamphetamine (ADDERALL XR, 30MG,) 30 MG 24 hr capsule Take 1 capsule (30 mg total) by mouth every morning Max Daily Amount: 30 mg 30 capsule 0    amphetamine-dextroamphetamine (ADDERALL, 10MG,) 10 mg tablet Take 1 tablet (10 mg total) by mouth 2 (two) times a day Max Daily Amount: 20 mg 30 tablet 0    temazepam (RESTORIL) 15 mg capsule Take 1 capsule (15 mg total) by mouth daily at bedtime as needed for sleep 10 capsule 0     No current facility-administered medications for this visit  No Known Allergies    Review of Systems   Constitutional: Positive for fatigue  Negative for activity change  Psychiatric/Behavioral: Positive for decreased concentration and sleep disturbance  Negative for agitation, behavioral problems, confusion, dysphoric mood, hallucinations, self-injury and suicidal ideas  The patient is not nervous/anxious and is not hyperactive          + trouble completing tasks   All other systems reviewed and are negative  Video Exam    There were no vitals filed for this visit  Physical Exam  Constitutional:       General: He is not in acute distress  Appearance: Normal appearance  He is not ill-appearing  HENT:      Head: Normocephalic  Eyes:      General:         Right eye: No discharge  Left eye: No discharge  Pulmonary:      Effort: No respiratory distress  Skin:     Findings: No rash  Neurological:      Mental Status: He is alert   Mental status is at baseline  Psychiatric:         Mood and Affect: Mood normal          Behavior: Behavior normal          Thought Content: Thought content normal          Judgment: Judgment normal           I spent 15 minutes directly with the patient during this visit    VIRTUAL VISIT [address] verbally agrees to participate in [employer]  Pt is aware that [employer] could be limited without vital signs or the ability to perform a full hands-on physical Trungadriana Abdi understands he or the provider may request at any time to terminate the video visit and request the patient to seek care or treatment in person

## 2022-01-14 DIAGNOSIS — G47.00 INSOMNIA, UNSPECIFIED TYPE: ICD-10-CM

## 2022-01-14 DIAGNOSIS — F98.8 ATTENTION DEFICIT DISORDER, UNSPECIFIED HYPERACTIVITY PRESENCE: Primary | ICD-10-CM

## 2022-01-14 RX ORDER — ESZOPICLONE 3 MG/1
3 TABLET, FILM COATED ORAL
Qty: 10 TABLET | Refills: 1 | Status: SHIPPED | OUTPATIENT
Start: 2022-01-14

## 2022-01-14 RX ORDER — DEXTROAMPHETAMINE SACCHARATE, AMPHETAMINE ASPARTATE, DEXTROAMPHETAMINE SULFATE AND AMPHETAMINE SULFATE 5; 5; 5; 5 MG/1; MG/1; MG/1; MG/1
TABLET ORAL
Qty: 30 TABLET | Refills: 0 | Status: SHIPPED | OUTPATIENT
Start: 2022-01-14 | End: 2022-01-31 | Stop reason: SDUPTHER

## 2022-01-31 DIAGNOSIS — F90.9 ATTENTION DEFICIT HYPERACTIVITY DISORDER (ADHD), UNSPECIFIED ADHD TYPE: ICD-10-CM

## 2022-01-31 DIAGNOSIS — F98.8 ATTENTION DEFICIT DISORDER, UNSPECIFIED HYPERACTIVITY PRESENCE: ICD-10-CM

## 2022-01-31 RX ORDER — DEXTROAMPHETAMINE SACCHARATE, AMPHETAMINE ASPARTATE, DEXTROAMPHETAMINE SULFATE AND AMPHETAMINE SULFATE 5; 5; 5; 5 MG/1; MG/1; MG/1; MG/1
TABLET ORAL
Qty: 30 TABLET | Refills: 0 | Status: SHIPPED | OUTPATIENT
Start: 2022-01-31 | End: 2022-03-28 | Stop reason: SDUPTHER

## 2022-01-31 RX ORDER — DEXTROAMPHETAMINE SACCHARATE, AMPHETAMINE ASPARTATE MONOHYDRATE, DEXTROAMPHETAMINE SULFATE AND AMPHETAMINE SULFATE 7.5; 7.5; 7.5; 7.5 MG/1; MG/1; MG/1; MG/1
30 CAPSULE, EXTENDED RELEASE ORAL EVERY MORNING
Qty: 30 CAPSULE | Refills: 0 | Status: SHIPPED | OUTPATIENT
Start: 2022-01-31 | End: 2022-03-28 | Stop reason: SDUPTHER

## 2022-01-31 NOTE — TELEPHONE ENCOUNTER
01/14/2022  2  01/14/2022  DEXTROAMP-AMPHETAMIN 20 MG TAB  30 0  30  [facility]  [id number]  [place] (2423)  0   [place]    01/08/2022  2  01/07/2022  TEMAZEPAM 15 MG CAPSULE  10 0  10  [facility]  [id number]  [place] (2423)  0   [place]    01/08/2022  2  01/07/2022  DEXTROAMP-AMPHETAMIN 10 MG TAB  30 0  15  [facility]  [id number]  [facility] (2423)  0   [place]

## 2022-05-21 DIAGNOSIS — F90.9 ATTENTION DEFICIT HYPERACTIVITY DISORDER (ADHD), UNSPECIFIED ADHD TYPE: ICD-10-CM

## 2022-05-21 DIAGNOSIS — F98.8 ATTENTION DEFICIT DISORDER, UNSPECIFIED HYPERACTIVITY PRESENCE: ICD-10-CM

## 2022-05-23 NOTE — TELEPHONE ENCOUNTER
1  [id number] 03/28/2022 03/28/2022 Amphetamine Salt Combo (Tablet)  30 0 30 20 MG NA CECIL BENNETT  [facility], L [facility]UL[facility] [employer] 00 / 0 [place]    1  [id number] 01/31/2022 01/31/2022 Mixed Amphetamine Salts (Capsule, Extended Release)  30 0 30 30 MG NA RADHA ESCOBAR  [facility], L [facility] [employer] 00 / 0 [place]    1  [id number] 01/14/2022 01/14/2022 Amphetamine Salt Combo (Tablet)  30 0 30 20 MG [facility] [employer] 00 / 0 [place]

## 2022-05-24 RX ORDER — DEXTROAMPHETAMINE SACCHARATE, AMPHETAMINE ASPARTATE, DEXTROAMPHETAMINE SULFATE AND AMPHETAMINE SULFATE 5; 5; 5; 5 MG/1; MG/1; MG/1; MG/1
TABLET ORAL
Qty: 30 TABLET | Refills: 0 | Status: SHIPPED | OUTPATIENT
Start: 2022-05-24 | End: 2022-06-22 | Stop reason: SDUPTHER

## 2022-05-24 RX ORDER — DEXTROAMPHETAMINE SACCHARATE, AMPHETAMINE ASPARTATE MONOHYDRATE, DEXTROAMPHETAMINE SULFATE AND AMPHETAMINE SULFATE 7.5; 7.5; 7.5; 7.5 MG/1; MG/1; MG/1; MG/1
30 CAPSULE, EXTENDED RELEASE ORAL EVERY MORNING
Qty: 30 CAPSULE | Refills: 0 | Status: SHIPPED | OUTPATIENT
Start: 2022-05-24 | End: 2022-06-22 | Stop reason: SDUPTHER

## 2022-06-22 DIAGNOSIS — F98.8 ATTENTION DEFICIT DISORDER, UNSPECIFIED HYPERACTIVITY PRESENCE: ICD-10-CM

## 2022-06-22 DIAGNOSIS — F90.9 ATTENTION DEFICIT HYPERACTIVITY DISORDER (ADHD), UNSPECIFIED ADHD TYPE: ICD-10-CM

## 2022-06-23 RX ORDER — DEXTROAMPHETAMINE SACCHARATE, AMPHETAMINE ASPARTATE MONOHYDRATE, DEXTROAMPHETAMINE SULFATE AND AMPHETAMINE SULFATE 7.5; 7.5; 7.5; 7.5 MG/1; MG/1; MG/1; MG/1
30 CAPSULE, EXTENDED RELEASE ORAL EVERY MORNING
Qty: 30 CAPSULE | Refills: 0 | Status: SHIPPED | OUTPATIENT
Start: 2022-06-23 | End: 2022-07-22 | Stop reason: SDUPTHER

## 2022-06-23 RX ORDER — DEXTROAMPHETAMINE SACCHARATE, AMPHETAMINE ASPARTATE, DEXTROAMPHETAMINE SULFATE AND AMPHETAMINE SULFATE 5; 5; 5; 5 MG/1; MG/1; MG/1; MG/1
TABLET ORAL
Qty: 30 TABLET | Refills: 0 | Status: SHIPPED | OUTPATIENT
Start: 2022-06-23 | End: 2022-07-22 | Stop reason: SDUPTHER

## 2022-06-23 NOTE — TELEPHONE ENCOUNTER
05/24/2022 05/24/2022 Amphetamine Salt Combo (Tablet)  30 0 30 20 MG NA RADHA SHAWN  [facility], OBDULIO [facility]  0 / 0 [place]    1  [id number] 05/24/2022 05/24/2022 Mixed Amphetamine Salts (Capsule, Extended Release)  30 0 30 30 MG NA RADHA

## 2022-07-20 ENCOUNTER — APPOINTMENT (OUTPATIENT)
Dept: URGENT CARE | Age: 27
End: 2022-07-20
Payer: OTHER MISCELLANEOUS

## 2022-07-20 PROCEDURE — 99283 EMERGENCY DEPT VISIT LOW MDM: CPT | Performed by: STUDENT IN AN ORGANIZED HEALTH CARE EDUCATION/TRAINING PROGRAM

## 2022-07-20 PROCEDURE — G0382 LEV 3 HOSP TYPE B ED VISIT: HCPCS | Performed by: STUDENT IN AN ORGANIZED HEALTH CARE EDUCATION/TRAINING PROGRAM

## 2022-07-22 DIAGNOSIS — F90.9 ATTENTION DEFICIT HYPERACTIVITY DISORDER (ADHD), UNSPECIFIED ADHD TYPE: ICD-10-CM

## 2022-07-22 DIAGNOSIS — F98.8 ATTENTION DEFICIT DISORDER, UNSPECIFIED HYPERACTIVITY PRESENCE: ICD-10-CM

## 2022-07-22 RX ORDER — DEXTROAMPHETAMINE SACCHARATE, AMPHETAMINE ASPARTATE, DEXTROAMPHETAMINE SULFATE AND AMPHETAMINE SULFATE 5; 5; 5; 5 MG/1; MG/1; MG/1; MG/1
TABLET ORAL
Qty: 30 TABLET | Refills: 0 | Status: SHIPPED | OUTPATIENT
Start: 2022-07-22 | End: 2022-08-23 | Stop reason: SDUPTHER

## 2022-07-22 RX ORDER — DEXTROAMPHETAMINE SACCHARATE, AMPHETAMINE ASPARTATE MONOHYDRATE, DEXTROAMPHETAMINE SULFATE AND AMPHETAMINE SULFATE 7.5; 7.5; 7.5; 7.5 MG/1; MG/1; MG/1; MG/1
30 CAPSULE, EXTENDED RELEASE ORAL EVERY MORNING
Qty: 30 CAPSULE | Refills: 0 | Status: SHIPPED | OUTPATIENT
Start: 2022-07-22 | End: 2022-08-23 | Stop reason: SDUPTHER

## 2022-07-22 NOTE — TELEPHONE ENCOUNTER
1 [id number] 06/23/2022 06/23/2022 Amphetamine Salt Combo (Tablet) 30 0 30 20 MG NA RADHA ESCOBAR [facility], [facility]ULIO [facility] 0 / 0 [place]    1 [id number] 06/23/2022 06/23/2022 Mixed Amphetamine Salt (Capsule, Extended Release) 30 0 30 30 MG NA RADHA [facility], [facility] 0 / 0 [place]

## 2022-08-02 ENCOUNTER — APPOINTMENT (OUTPATIENT)
Dept: URGENT CARE | Age: 27
End: 2022-08-02
Payer: OTHER MISCELLANEOUS

## 2022-08-02 PROCEDURE — 99213 OFFICE O/P EST LOW 20 MIN: CPT | Performed by: STUDENT IN AN ORGANIZED HEALTH CARE EDUCATION/TRAINING PROGRAM

## 2022-08-23 DIAGNOSIS — F90.9 ATTENTION DEFICIT HYPERACTIVITY DISORDER (ADHD), UNSPECIFIED ADHD TYPE: ICD-10-CM

## 2022-08-23 DIAGNOSIS — F98.8 ATTENTION DEFICIT DISORDER, UNSPECIFIED HYPERACTIVITY PRESENCE: ICD-10-CM

## 2022-08-23 RX ORDER — DEXTROAMPHETAMINE SACCHARATE, AMPHETAMINE ASPARTATE, DEXTROAMPHETAMINE SULFATE AND AMPHETAMINE SULFATE 5; 5; 5; 5 MG/1; MG/1; MG/1; MG/1
TABLET ORAL
Qty: 30 TABLET | Refills: 0 | Status: SHIPPED | OUTPATIENT
Start: 2022-08-23 | End: 2022-09-22 | Stop reason: SDUPTHER

## 2022-08-23 RX ORDER — DEXTROAMPHETAMINE SACCHARATE, AMPHETAMINE ASPARTATE MONOHYDRATE, DEXTROAMPHETAMINE SULFATE AND AMPHETAMINE SULFATE 7.5; 7.5; 7.5; 7.5 MG/1; MG/1; MG/1; MG/1
30 CAPSULE, EXTENDED RELEASE ORAL EVERY MORNING
Qty: 30 CAPSULE | Refills: 0 | Status: SHIPPED | OUTPATIENT
Start: 2022-08-23 | End: 2022-09-22 | Stop reason: SDUPTHER

## 2022-08-23 NOTE — TELEPHONE ENCOUNTER
1  [id number] 08/11/2022 08/11/2022 oxyCODONE HCL-ACETAMINOPHEN (Tablet)  20 0 4 325 MG-5 MG 37 50 [place]ACINTOLYDIA [place]  [facility], OBDULIO GAGNON  [employer] 0 / 0 [address]    1  [id number] 07/22/2022 07/22/2022 Amphetamine Salt Combo (Tablet)  30 0 30 20 MG NA RADHA [facility], OBDULIO [facility] Us 0 / 0 [address]    1  [id number] 07/22/2022 07/22/2022 Dextroamph Sacc-amph Asp-dextroam S (Capsule, Extended Release)  30 0 30 30 MG NA RADHA [facility], OBDULIO [facility] 0 / 0 [place]

## 2022-08-24 ENCOUNTER — TELEPHONE (OUTPATIENT)
Dept: FAMILY MEDICINE CLINIC | Facility: CLINIC | Age: 27
End: 2022-08-24

## 2022-08-24 NOTE — TELEPHONE ENCOUNTER
Patient called stating he has an abscess that is starting to form above his tailbone  He is asking for an appointment with any provider to have it looked at

## 2022-08-28 ENCOUNTER — OFFICE VISIT (OUTPATIENT)
Dept: URGENT CARE | Facility: MEDICAL CENTER | Age: 27
End: 2022-08-28
Payer: COMMERCIAL

## 2022-08-28 VITALS
WEIGHT: 315 LBS | HEART RATE: 104 BPM | RESPIRATION RATE: 16 BRPM | TEMPERATURE: 97.9 F | BODY MASS INDEX: 53.95 KG/M2 | DIASTOLIC BLOOD PRESSURE: 80 MMHG | SYSTOLIC BLOOD PRESSURE: 114 MMHG | OXYGEN SATURATION: 97 %

## 2022-08-28 DIAGNOSIS — L05.01 PILONIDAL ABSCESS: Primary | ICD-10-CM

## 2022-08-28 PROCEDURE — 10080 I&D PILONIDAL CYST SIMPLE: CPT | Performed by: PHYSICIAN ASSISTANT

## 2022-08-28 PROCEDURE — 10060 I&D ABSCESS SIMPLE/SINGLE: CPT | Performed by: PHYSICIAN ASSISTANT

## 2022-08-28 PROCEDURE — 99213 OFFICE O/P EST LOW 20 MIN: CPT | Performed by: PHYSICIAN ASSISTANT

## 2022-08-28 RX ORDER — SULFAMETHOXAZOLE AND TRIMETHOPRIM 800; 160 MG/1; MG/1
1 TABLET ORAL EVERY 12 HOURS SCHEDULED
Qty: 14 TABLET | Refills: 0 | Status: SHIPPED | OUTPATIENT
Start: 2022-08-28 | End: 2022-09-04

## 2022-08-28 NOTE — PATIENT INSTRUCTIONS
1  Keep skin clean and dry  2  Motrin as needed for discomfort  3 Return for packing removal in 2 days  4  Watch for S&s of infection (redness, swelling, drainage, fever)   5   Take Bactrim DS 1 tablet twice daily x 7 days

## 2022-08-28 NOTE — PROGRESS NOTES
[address] [employer] [facility]        NAME: Kamini Graham is a 23 y o  male  : 1995    MRN: [id number]  DATE: 2022  TIME: 9:37 AM    Assessment and Plan   Pilonidal abscess [L05 01]  1  Pilonidal abscess  Incision and drain    sulfamethoxazole-trimethoprim (BACTRIM DS) 800-160 mg per tablet         Patient Instructions     1  Keep skin clean and dry  2  Motrin as needed for discomfort  3 Return for packing removal in 2 days  4  Watch for S&s of infection (redness, swelling, drainage, fever)   5  Take Bactrim DS 1 tablet twice daily x 7 days      Chief Complaint     Chief Complaint   Patient presents with    Mass     Tailbone x 3 days ago  Painful  No fever  History of Present Illness       Vita Alford is a 67-year-old male presents with a painful lesion on his sacral area that he noticed 2 days prior  Patient reports started with a small pimple but increased in size in severity over the past 2 days  Patient denies any discharge reports he has a history of pilonidal abscess  Review of Systems   Review of Systems   Constitutional: Negative  HENT: Negative  Musculoskeletal: Negative  Skin: Positive for color change and wound           Current Medications       Current Outpatient Medications:     amphetamine-dextroamphetamine (ADDERALL XR, 30MG,) 30 MG 24 hr capsule, Take 1 capsule (30 mg total) by mouth every morning Max Daily Amount: 30 mg, Disp: 30 capsule, Rfl: 0    amphetamine-dextroamphetamine (ADDERALL, 20MG,) 20 mg tablet, Take one tab in afternoon for breakthrough symtpoms, Disp: 30 tablet, Rfl: 0    sulfamethoxazole-trimethoprim (BACTRIM DS) 800-160 mg per tablet, Take 1 tablet by mouth every 12 (twelve) hours for 7 days, Disp: 14 tablet, Rfl: 0    eszopiclone (LUNESTA) 3 MG tablet, Take 1 tablet (3 mg total) by mouth daily at bedtime as needed for sleep Take immediately before bedtime (Patient not taking: Reported on 2022), Disp: 10 tablet, Rfl: 1    temazepam (RESTORIL) 15 mg capsule, Take 1 capsule (15 mg total) by mouth daily at bedtime as needed for sleep (Patient not taking: Reported on 8/28/2022), Disp: 10 capsule, Rfl: 0    Current Allergies     Allergies as of 08/28/2022    (No Known Allergies)            The following portions of the patient's history were reviewed and updated as appropriate: allergies, current medications, past family history, past medical history, past social history, past surgical history and problem list      Past Medical History:   Diagnosis Date    Herpes simplex type 1 infection     last assessed 04/07/2014    Lymphadenitis, chronic     resolved 04/07/2014    Melanoma ([facility] [address] )     back    Viral warts     last assessed 04/07/14       Past Surgical History:   Procedure Laterality Date    MASTECTOMY PARTIAL / LUMPECTOMY W/ AXILLARY LYMPHADENECTOMY      resolved 06/04/3425    UMBILICAL HERNIA REPAIR         Family History   Problem Relation Age of Onset    Breast cancer Mother     Diabetes Maternal Grandmother     Heart disease Paternal Grandfather     Hypertension Paternal Grandfather     Cancer Family          Medications have been verified  Objective   /80   Pulse 104   Temp 97 9 °F (36 6 °C)   Resp 16   Wt (!) 171 kg (376 lb)   SpO2 97%   BMI 53 95 kg/m²   No LMP for male patient  Physical Exam     Physical Exam  Constitutional:       General: He is not in acute distress  Appearance: Normal appearance  He is not ill-appearing  Cardiovascular:      Rate and Rhythm: Normal rate and regular rhythm  Heart sounds: Normal heart sounds  No murmur heard  Pulmonary:      Effort: Pulmonary effort is normal       Breath sounds: Normal breath sounds  Skin:     Comments: Two 3 cm abscess noted on the pilonidal region  Neurological:      Mental Status: He is alert         Incision and drain    Date/Time: 8/28/2022 9:34 AM  Performed by: Rickey Masters PA-C  Authorized by: Rickey Masters PA-C   Universal Protocol:  Consent given by: patient  Patient understanding: patient states understanding of the procedure being performed  Patient consent: the patient's understanding of the procedure matches consent given      Location:     Type:  Abscess    Location:  Anogenital    Anogenital location:  Pilonidal  Pre-procedure details:     Skin preparation:  Antiseptic wash and Betadine  Procedure details:     Complexity:  Simple    Incision types:  Stab incision    Scalpel blade:  11    Incision depth:  Subcutaneous    Wound management:  Probed and deloculated and extensive cleaning    Drainage:  Purulent and bloody    Drainage amount: Moderate    Packing materials:  1/4 in iodoform gauze    Amount 1/4" iodoform:  5 inches  Post-procedure details:     Patient tolerance of procedure:   Tolerated well, no immediate complications

## 2022-09-22 DIAGNOSIS — F90.9 ATTENTION DEFICIT HYPERACTIVITY DISORDER (ADHD), UNSPECIFIED ADHD TYPE: ICD-10-CM

## 2022-09-22 DIAGNOSIS — F98.8 ATTENTION DEFICIT DISORDER, UNSPECIFIED HYPERACTIVITY PRESENCE: ICD-10-CM

## 2022-09-22 RX ORDER — DEXTROAMPHETAMINE SACCHARATE, AMPHETAMINE ASPARTATE, DEXTROAMPHETAMINE SULFATE AND AMPHETAMINE SULFATE 5; 5; 5; 5 MG/1; MG/1; MG/1; MG/1
TABLET ORAL
Qty: 30 TABLET | Refills: 0 | Status: SHIPPED | OUTPATIENT
Start: 2022-09-22 | End: 2022-10-24 | Stop reason: SDUPTHER

## 2022-09-22 RX ORDER — DEXTROAMPHETAMINE SACCHARATE, AMPHETAMINE ASPARTATE MONOHYDRATE, DEXTROAMPHETAMINE SULFATE AND AMPHETAMINE SULFATE 7.5; 7.5; 7.5; 7.5 MG/1; MG/1; MG/1; MG/1
30 CAPSULE, EXTENDED RELEASE ORAL EVERY MORNING
Qty: 30 CAPSULE | Refills: 0 | Status: SHIPPED | OUTPATIENT
Start: 2022-09-22 | End: 2022-10-24 | Stop reason: SDUPTHER

## 2022-09-22 NOTE — TELEPHONE ENCOUNTER
1  [id number] 08/23/2022 08/23/2022 Amphetamine Salt Combo (Tablet)  30 0 30 20 MG NA [facility] 0 / 0 [place]    1  [id number] 08/23/2022 08/23/2022 Dextroamph Sacc-amph Asp-dextroam S (Capsule, Extended Release)  30 0 30 30 MG NA [facility] 0 / 0 [place]    1  [id number] 08/11/2022 08/11/2022 oxyCODONE HCL-ACETAMINOPHEN (Tablet)  20 0 4 325 MG-5 MG 37 50 [place]ACINTO [place]  [facility]    [employer] [place] / 0 [place]

## 2022-10-10 ENCOUNTER — TELEPHONE (OUTPATIENT)
Dept: FAMILY MEDICINE CLINIC | Facility: CLINIC | Age: 27
End: 2022-10-10

## 2022-10-10 NOTE — TELEPHONE ENCOUNTER
Patient called stating that he has developed a sty on his left eye and a cyst under his left armpit  He states that he has discomfort with the cyst under his arm  He is asking for an appointment with PCP only

## 2022-10-11 ENCOUNTER — OFFICE VISIT (OUTPATIENT)
Dept: FAMILY MEDICINE CLINIC | Facility: CLINIC | Age: 27
End: 2022-10-11
Payer: COMMERCIAL

## 2022-10-11 VITALS
OXYGEN SATURATION: 97 % | BODY MASS INDEX: 45.1 KG/M2 | HEIGHT: 70 IN | SYSTOLIC BLOOD PRESSURE: 128 MMHG | TEMPERATURE: 97.2 F | WEIGHT: 315 LBS | DIASTOLIC BLOOD PRESSURE: 72 MMHG | RESPIRATION RATE: 16 BRPM | HEART RATE: 82 BPM

## 2022-10-11 DIAGNOSIS — L04.9 SUPPURATIVE LYMPHADENITIS: ICD-10-CM

## 2022-10-11 DIAGNOSIS — H00.15 CHALAZION OF LEFT LOWER EYELID: Primary | ICD-10-CM

## 2022-10-11 DIAGNOSIS — E55.9 VITAMIN D DEFICIENCY: ICD-10-CM

## 2022-10-11 DIAGNOSIS — R53.83 OTHER FATIGUE: ICD-10-CM

## 2022-10-11 DIAGNOSIS — R73.9 HYPERGLYCEMIA: ICD-10-CM

## 2022-10-11 DIAGNOSIS — Z82.49 FAMILY HISTORY OF HEART DISEASE: ICD-10-CM

## 2022-10-11 DIAGNOSIS — N40.0 BENIGN PROSTATIC HYPERPLASIA WITHOUT LOWER URINARY TRACT SYMPTOMS: ICD-10-CM

## 2022-10-11 DIAGNOSIS — E78.00 HYPERCHOLESTEROLEMIA: ICD-10-CM

## 2022-10-11 DIAGNOSIS — N62 GYNECOMASTIA: ICD-10-CM

## 2022-10-11 DIAGNOSIS — R63.5 WEIGHT GAIN: ICD-10-CM

## 2022-10-11 PROCEDURE — 99214 OFFICE O/P EST MOD 30 MIN: CPT | Performed by: FAMILY MEDICINE

## 2022-10-11 RX ORDER — CEPHALEXIN 500 MG/1
500 CAPSULE ORAL EVERY 8 HOURS SCHEDULED
Qty: 30 CAPSULE | Refills: 0 | Status: SHIPPED | OUTPATIENT
Start: 2022-10-11 | End: 2022-10-21

## 2022-10-11 RX ORDER — CEPHALEXIN 500 MG/1
500 CAPSULE ORAL EVERY 8 HOURS SCHEDULED
Qty: 30 CAPSULE | Refills: 0 | Status: SHIPPED | OUTPATIENT
Start: 2022-10-11 | End: 2022-10-11 | Stop reason: CLARIF

## 2022-10-13 NOTE — PROGRESS NOTES
Patient ID: Evi Welch is a 33 y o  male  HPI: 33 y o male presents for a chalazion of his left right lower eyelid  He also is having a flair up of supprative lymphadenitis of his left axilla  He has also gained more than 70 pounds and is seeing a nutrionist and health  who is requesting some labs  Pt has had a history of elevated gluocse, gynecomastia  There is a family history of heart disease as well  Pt complains of fatigue      SUBJECTIVE    Family History   Problem Relation Age of Onset   • Breast cancer Mother    • Diabetes Maternal Grandmother    • Heart disease Paternal Grandfather    • Hypertension Paternal Grandfather    • Cancer Family      Social History     Socioeconomic History   • Marital status: Single     Spouse name: Not on file   • Number of children: Not on file   • Years of education: Not on file   • Highest education level: Not on file   Occupational History   • Not on file   Tobacco Use   • Smoking status: Never Smoker   • Smokeless tobacco: Never Used   Vaping Use   • Vaping Use: Never used   Substance and Sexual Activity   • Alcohol use: Yes     Comment: social   • Drug use: No   • Sexual activity: Not on file   Other Topics Concern   • Not on file   Social History Narrative   • Not on file     Social Determinants of Health     Financial Resource Strain: Not on file   Food Insecurity: Not on file   Transportation Needs: Not on file   Physical Activity: Not on file   Stress: Not on file   Social Connections: Not on file   Intimate Partner Violence: Not on file   Housing Stability: Not on file     Past Medical History:   Diagnosis Date   • Herpes simplex type 1 infection     last assessed 04/07/2014   • Lymphadenitis, chronic     resolved 04/07/2014   • Melanoma ([facility] [address] )     back   • Viral warts     last assessed 04/07/14     Past Surgical History:   Procedure Laterality Date   • MASTECTOMY PARTIAL / LUMPECTOMY W/ AXILLARY LYMPHADENECTOMY      resolved 13/48/9242   • UMBILICAL HERNIA REPAIR       No Known Allergies    Current Outpatient Medications:   •  amphetamine-dextroamphetamine (ADDERALL XR, 30MG,) 30 MG 24 hr capsule, Take 1 capsule (30 mg total) by mouth every morning Max Daily Amount: 30 mg, Disp: 30 capsule, Rfl: 0  •  amphetamine-dextroamphetamine (ADDERALL, 20MG,) 20 mg tablet, Take one tab in afternoon for breakthrough symtpoms, Disp: 30 tablet, Rfl: 0  •  cephalexin (KEFLEX) 500 mg capsule, Take 1 capsule (500 mg total) by mouth every 8 (eight) hours for 10 days, Disp: 30 capsule, Rfl: 0    Review of Systems  Constitutional:     Denies fever, chills ,+fatigue ,weakness ,weight loss, +weight gain     ENT: Denies earache ,loss of hearing ,nosebleed, nasal discharge,nasal congestion ,sore throat ,hoarseness+ chalazion on left lower eyelid  Pulmonary: Denies shortness of breath ,cough  ,dyspnea on exertion, orthopnea  ,PND   Cardiovascular:  Denies bradycardia , tachycardia  ,palpations, lower extremity edema leg, claudication  Breast:  Denies new or changing breast lumps ,nipple discharge ,nipple changes+ gynecolmastia  Abdomen:  Denies abdominal pain , anorexia , indigestion, nausea, vomiting, constipation, diarrhea  Musculoskeletal: Denies myalgias, arthralgias, joint swelling, joint stiffness , limb pain, limb swelling  Gu: denies dysuria, polyuria  Skin: Denies skin rash, skin lesion, skin wound, itching, dry skin+ iinflammed red skin and swollen lymph node under left axilla  Neuro: Denies headache, numbness, tingling, confusion, loss of consciousness, dizziness, vertigo  Psychiatric: Denies feelings of depression, suicidal ideation, anxiety, sleep disturbances    OBJECTIVE  /72   Pulse 82   Temp (!) 97 2 °F (36 2 °C)   Resp 16   Ht 5' 10" (1 778 m)   Wt (!) 171 kg (376 lb)   SpO2 97%   BMI 53 95 kg/m²   Constitutional:   NAD, well appearing and well nourished      ENT:   Conjunctiva and lids: + chalazion on left lower eyelid with erythema and edema  Pupils and iris: RUI bilaterally    External inspection of ears and nose: normal without deformities or discharge  Otoscopic exam: Canals patent without erythema  Nasal mucosa, septum and turbinates: Normal or edema or discharge         Oropharynx:  Moist mucosa, normal tongue and tonsils without lesions  No erythema        Pulmonary:Respiratory effort normal rate and rhythm, no increased work of breathing  Auscultation of lungs:  Clear bilaterally with no adventitious breath sounds       Cardiovascular: regular rate and rhythm, S1 and S2, no murmur, no edema and/or varicosities of LE      Abdomen: Soft and non-distended     Positive bowel sounds      No heptomegaly or splenomegaly      Gu: no suprapubic tenderness or CVA tenderness, no urethral discharge  Lymphatic:+ left axilla erythematous, edematous and tender on palpation    Musculoskeletal:  Gait and station: Normal gait      Digits and nails normal without clubbing or cyanosis       Inspection/palpation of joints, bones, and muscles:  No joint tenderness, swelling, full active and passive range of motion       Skin: Normal skin turgor and no rashes + gynecomastia     Neuro:      Normal reflexes     Psych:   alert and oriented to person, place and time     normal mood and affect       Assessment/Plan:Diagnoses and all orders for this visit:    Chalazion of left lower eyelid  -     Discontinue: cephalexin (KEFLEX) 500 mg capsule; Take 1 capsule (500 mg total) by mouth every 8 (eight) hours for 10 days  -     cephalexin (KEFLEX) 500 mg capsule; Take 1 capsule (500 mg total) by mouth every 8 (eight) hours for 10 days    Suppurative lymphadenitis    Weight gain  -     TSH, 3rd generation with Free T4 reflex; Future    Other fatigue  -     Testosterone, Free+Total LC/MS; Future  -     Dihydrotestosterone; Future  -     Comprehensive metabolic panel; Future  -     Comprehensive metabolic panel;  Future  -     CBC and differential; Future    Benign prostatic hyperplasia without lower urinary tract symptoms  -     PSA Total, Diagnostic; Future    Vitamin D deficiency  -     Vitamin D 25 hydroxy; Future    Hypercholesterolemia  -     Lipid panel; Future    Hyperglycemia  -     HEMOGLOBIN A1C W/ EAG ESTIMATION; Future    Family history of heart disease  -     Coenzyme Q10; Future  -     Estradiol, Free, LC/MS/MS; Future    Gynecomastia  -     Homocysteine, serum; Future  -     Prolactin; Future  -     FSH and LH; Future  -     Progesterone; Future  -     Estrone;  Future        Reviewed with patient plan to treat with above plan   Patient instructed to call in 72 hours if not feeling better or if symptoms worsen

## 2022-11-21 ENCOUNTER — TELEPHONE (OUTPATIENT)
Dept: FAMILY MEDICINE CLINIC | Facility: CLINIC | Age: 27
End: 2022-11-21

## 2022-11-25 DIAGNOSIS — F98.8 ATTENTION DEFICIT DISORDER, UNSPECIFIED HYPERACTIVITY PRESENCE: ICD-10-CM

## 2022-11-25 DIAGNOSIS — F90.9 ATTENTION DEFICIT HYPERACTIVITY DISORDER (ADHD), UNSPECIFIED ADHD TYPE: ICD-10-CM

## 2022-11-25 RX ORDER — DEXTROAMPHETAMINE SACCHARATE, AMPHETAMINE ASPARTATE MONOHYDRATE, DEXTROAMPHETAMINE SULFATE AND AMPHETAMINE SULFATE 7.5; 7.5; 7.5; 7.5 MG/1; MG/1; MG/1; MG/1
30 CAPSULE, EXTENDED RELEASE ORAL EVERY MORNING
Qty: 30 CAPSULE | Refills: 0 | Status: SHIPPED | OUTPATIENT
Start: 2022-11-25

## 2022-11-25 RX ORDER — DEXTROAMPHETAMINE SACCHARATE, AMPHETAMINE ASPARTATE, DEXTROAMPHETAMINE SULFATE AND AMPHETAMINE SULFATE 5; 5; 5; 5 MG/1; MG/1; MG/1; MG/1
TABLET ORAL
Qty: 30 TABLET | Refills: 0 | Status: SHIPPED | OUTPATIENT
Start: 2022-11-25

## 2022-11-25 NOTE — TELEPHONE ENCOUNTER
1  [id number] 10/24/2022  10/24/2022 Adderall Xr (Capsule, Extended Release)  30 0 30 30 MG NA [facility] [employer] 0 / 0 [place]    1  [id number] 10/24/2022  10/24/2022 Amphetamine Salt Combo (Tablet)  30 0 30 20 MG NA [facility], [facility] [employer] 0 / 0 [place]    1  [id number] 09/22/2022 09/22/2022 Dextroamph Sacc-amph Asp-dextroam S (Capsule, Extended Release)  30 0 30 30 MG NA [facility], [facility] [employer] [facility] [place] / 0 [place]

## 2022-12-19 ENCOUNTER — OFFICE VISIT (OUTPATIENT)
Dept: URGENT CARE | Facility: MEDICAL CENTER | Age: 27
End: 2022-12-19

## 2022-12-19 VITALS
RESPIRATION RATE: 18 BRPM | DIASTOLIC BLOOD PRESSURE: 87 MMHG | SYSTOLIC BLOOD PRESSURE: 146 MMHG | OXYGEN SATURATION: 96 % | HEART RATE: 120 BPM | WEIGHT: 315 LBS | HEIGHT: 70 IN | TEMPERATURE: 97.8 F | BODY MASS INDEX: 45.1 KG/M2

## 2022-12-19 DIAGNOSIS — L05.91 PILONIDAL CYST: Primary | ICD-10-CM

## 2022-12-19 RX ORDER — CEPHALEXIN 500 MG/1
500 CAPSULE ORAL EVERY 6 HOURS SCHEDULED
Qty: 28 CAPSULE | Refills: 0 | Status: SHIPPED | OUTPATIENT
Start: 2022-12-19 | End: 2022-12-26

## 2022-12-19 NOTE — PROGRESS NOTES
[employer] [facility]        NAME: Marissa Topete is a 33 y o  male  : 1995    MRN: [id number]  DATE: 2022  TIME: 3:17 PM    Assessment and Plan   Pilonidal cyst [L05 91]  1  Pilonidal cyst  cephalexin (KEFLEX) 500 mg capsule            Patient Instructions     Pilonidal cyst   Keflex as directed  Return in 48 hours for packing removal  Follow up with PCP in 3-5 days  Proceed to  ER if symptoms worsen  Chief Complaint     Chief Complaint   Patient presents with   • Abscess     To his tailbone         History of Present Illness       31-year-old male who presents complaining of pilonidal cyst   Patient states that he has had multiple episodes of similar symptoms in the past   Denies fevers, chills, trauma      Review of Systems   Review of Systems   Constitutional: Negative  HENT: Negative  Eyes: Negative  Respiratory: Negative  Negative for apnea, cough, choking, chest tightness, shortness of breath, wheezing and stridor  Cardiovascular: Negative  Negative for chest pain           Current Medications       Current Outpatient Medications:   •  amphetamine-dextroamphetamine (ADDERALL XR, 30MG,) 30 MG 24 hr capsule, Take 1 capsule (30 mg total) by mouth every morning Max Daily Amount: 30 mg, Disp: 30 capsule, Rfl: 0  •  amphetamine-dextroamphetamine (ADDERALL, 20MG,) 20 mg tablet, Take one tab in afternoon for breakthrough symtpoms, Disp: 30 tablet, Rfl: 0  •  cephalexin (KEFLEX) 500 mg capsule, Take 1 capsule (500 mg total) by mouth every 6 (six) hours for 7 days, Disp: 28 capsule, Rfl: 0    Current Allergies     Allergies as of 2022   • (No Known Allergies)            The following portions of the patient's history were reviewed and updated as appropriate: allergies, current medications, past family history, past medical history, past social history, past surgical history and problem list      Past Medical History:   Diagnosis Date   • Herpes simplex type 1 infection last assessed 04/07/2014   • Lymphadenitis, chronic     resolved 04/07/2014   • Melanoma ([facility] [address] )     back   • Viral warts     last assessed 04/07/14       Past Surgical History:   Procedure Laterality Date   • MASTECTOMY PARTIAL / LUMPECTOMY W/ AXILLARY LYMPHADENECTOMY      resolved 45/20/2082   • UMBILICAL HERNIA REPAIR         Family History   Problem Relation Age of Onset   • Breast cancer Mother    • Diabetes Maternal Grandmother    • Heart disease Paternal Grandfather    • Hypertension Paternal Grandfather    • Cancer Family          Medications have been verified  Objective   /87   Pulse (!) 120   Temp 97 8 °F (36 6 °C)   Resp 18   Ht 5' 10" (1 778 m)   Wt (!) 166 kg (367 lb)   SpO2 96%   BMI 52 66 kg/m²        Physical Exam     Physical Exam  Constitutional:       General: He is not in acute distress  Appearance: He is well-developed  He is not diaphoretic  Cardiovascular:      Rate and Rhythm: Normal rate and regular rhythm  Heart sounds: Normal heart sounds  Pulmonary:      Effort: Pulmonary effort is normal  No respiratory distress  Breath sounds: Normal breath sounds  No wheezing or rales  Chest:      Chest wall: No tenderness  Musculoskeletal:      Cervical back: Normal range of motion and neck supple  Lymphadenopathy:      Cervical: No cervical adenopathy  Skin:                 Incision and drain    Date/Time: 12/19/2022 3:19 PM  Performed by: Roshan Marie PA-C  Authorized by: Roshan Marie PA-C   Universal Protocol:  Consent: Verbal consent obtained    Risks and benefits: risks, benefits and alternatives were discussed  Consent given by: patient  Patient understanding: patient states understanding of the procedure being performed  Patient identity confirmed: verbally with patient      Patient location:  Clinic  Location:     Type:  Pilonidal cyst    Location:  Anogenital    Anogenital location:  Pilonidal  Pre-procedure details:     Skin preparation: Betadine  Anesthesia (see MAR for exact dosages): Anesthesia method:  Local infiltration    Local anesthetic:  Lidocaine 1% w/o epi  Procedure details:     Complexity:  Simple    Needle aspiration: no      Incision types:  Stab incision    Scalpel blade:  11    Approach:  Open    Incision depth:  Subcutaneous    Drainage:  Purulent    Drainage amount:  Copious    Wound treatment:  Packing placed and wound left open    Packing materials:  1/2 in iodoform gauze  Post-procedure details:     Patient tolerance of procedure:   Tolerated well, no immediate complications

## 2022-12-19 NOTE — PATIENT INSTRUCTIONS
Pilonidal cyst   Keflex as directed  Return in 48 hours for packing removal  Follow up with PCP in 3-5 days  Proceed to  ER if symptoms worsen

## 2023-05-14 DIAGNOSIS — F90.9 ATTENTION DEFICIT HYPERACTIVITY DISORDER (ADHD), UNSPECIFIED ADHD TYPE: ICD-10-CM

## 2023-05-14 DIAGNOSIS — F98.8 ATTENTION DEFICIT DISORDER, UNSPECIFIED HYPERACTIVITY PRESENCE: ICD-10-CM

## 2023-05-15 NOTE — TELEPHONE ENCOUNTER
1  [id number] 04/05/2023 04/05/2023 Amphetamine Salt Combo (Tablet)  30 0 30 20 MG NA [facility], L OBDULIO GAGNON  [employer] 0 / 0 [place]    1  [id number] 04/05/2023 04/05/2023 Mixed Amphetamine Salts (Capsule, Extended Release)  30 0 30 7 5 MG / 7 5 MG / 7 5 MG / 7 5 MG NA [facility], L [facility]  [employer] 0 / 0 [place]    1  [id number] 11/25/2022 11/25/2022 Adderall Xr (Capsule, Extended Release)  30 0 30 7 5 MG / 7 5 MG / 7 5 MG / 7 5 MG NA [facility] [employer] [facility] / [facility]    1  [id number] 11/25/2022 11/25/2022 Amphetamine Salt Combo (Tablet)  30 0 30 20 MG [facility], L [facility]  [employer] 0 / 0 [facility]    1  [id number] 10/24/2022  10/24/2022 Adderall Xr (Capsule, Extended Release)  30 0 30 7 5 MG / 7 5 MG / 7 5 MG / 7 5 MG [facility], L [facility] [employer] [facility] / [facility]    1  [id number] 10/24/2022  10/24/2022 Amphetamine Salt Combo (Tablet)  30 0 30 20 MG NA [facility], L [facility]IO [facility]  [employer] 0 / 0 [facility]    1  [id number] 09/22/2022 09/22/2022 Dextroamph Sacc-amph Asp-dextroam S (Capsule, Extended Release)  30 0 30 7 5 MG / 7 5 MG / 7 5 MG / 7 5 MG NA [facility], L [facility]ULIO [facility]  [employer] 0 / 0 [place]    1  [id number] 09/22/2022 09/22/2022 Amphetamine Salt Combo (Tablet)  30 0 30 20 MG NA [facility], L [facility]ULIO [facility]  [employer] 0 / 0 [place]    1  [id number] 08/23/2022 08/23/2022 Amphetamine Salt Combo (Tablet)  30 0 30 20 MG NA [facility]SP[facility]  [facility] [address] [facility], [facility]ULIO [facility]  0 / 0 [place]    1  [id number] 08/23/2022 08/23/2022 Dextroamph Sacc-amph Asp-dextroam S (Capsule, Extended Release)  30 0 30 7 5 MG / 7 5 MG / 7 5 MG / 7 5 MG CAITLIN JOEL

## 2023-05-16 RX ORDER — DEXTROAMPHETAMINE SACCHARATE, AMPHETAMINE ASPARTATE MONOHYDRATE, DEXTROAMPHETAMINE SULFATE AND AMPHETAMINE SULFATE 7.5; 7.5; 7.5; 7.5 MG/1; MG/1; MG/1; MG/1
30 CAPSULE, EXTENDED RELEASE ORAL EVERY MORNING
Qty: 30 CAPSULE | Refills: 0 | Status: SHIPPED | OUTPATIENT
Start: 2023-05-16

## 2023-05-16 RX ORDER — DEXTROAMPHETAMINE SACCHARATE, AMPHETAMINE ASPARTATE, DEXTROAMPHETAMINE SULFATE AND AMPHETAMINE SULFATE 5; 5; 5; 5 MG/1; MG/1; MG/1; MG/1
TABLET ORAL
Qty: 30 TABLET | Refills: 0 | Status: SHIPPED | OUTPATIENT
Start: 2023-05-16

## 2023-06-19 DIAGNOSIS — F98.8 ATTENTION DEFICIT DISORDER, UNSPECIFIED HYPERACTIVITY PRESENCE: ICD-10-CM

## 2023-06-19 DIAGNOSIS — F90.9 ATTENTION DEFICIT HYPERACTIVITY DISORDER (ADHD), UNSPECIFIED ADHD TYPE: ICD-10-CM

## 2023-06-19 NOTE — TELEPHONE ENCOUNTER
1 [id number] 05/18/2023 05/16/2023 Mixed Amphetamine Salt (Capsule, Extended Release) 30 0 30 7 5 MG / 7 5 MG / 7 5 MG / 7 5 MG NA [id number] [employer] 0 / 0 [place]    1 [id number] 05/18/2023 05/16/2023 Amphetamine Salt Combo (Tablet) 30 0 30 20 MG NA [id number] [employer] 0 / 0 [place]    1 [id number] 04/05/2023 04/05/2023 Amphetamine Salt Combo (Tablet) 30 0 30 20 MG NA [facility], L [facility]BDULIO GAGNON  [employer] 0 / 0 [place]    1 [id number] 04/05/2023 04/05/2023 Mixed Amphetamine Salts (Capsule, Extended Release) 30 0 30 7 5 MG / 7 5 MG / 7 5 MG / 7 5 MG NA [facility], L OBDULIO [facility]  [employer] 0 / 0 [place]    1 [id number] 11/25/2022 11/25/2022 Adderall Xr (Capsule, Extended Release) 30 0 30 7 5 MG / 7 5 MG / 7 5 MG / 7 5 MG NA [facility], L [facility]BDULIO [facility]  [employer] 0 / 0 [place]    1 [id number] 11/25/2022 11/25/2022 Amphetamine Salt Combo (Tablet) 30 0 30 20 MG NA [facility], L [facility]IO [facility]  [employer] 0 / 0 [place]    1 [id number] 10/24/2022 10/24/2022 Adderall Xr (Capsule, Extended Release) 30 0 30 7 5 MG / 7 5 MG / 7 5 MG / 7 5 MG [facility]  [employer] 0 / 0 [place]    1 [id number] 10/24/2022 10/24/2022 Amphetamine Salt Combo (Tablet) 30 0 30 20 MG NA [facility], L [facility]BDULIO [facility]  [employer] 0 / 0 [place]    1 [id number] 09/22/2022 09/22/2022 Dextroamph Sacc-amph Asp-dextroam S (Capsule, Extended Release) 30 0 30 7 5 MG / 7 5 MG / 7 5 MG / 7 5 MG NA [facility], OBDULIO [facility]  0 / 0 [place]    1 [id number] 09/22/2022 09/22/2022 Amphetamine Salt Combo (Tablet) 30 0 30 20 MG NA [facility] ESC[facility]

## 2023-06-20 RX ORDER — DEXTROAMPHETAMINE SACCHARATE, AMPHETAMINE ASPARTATE MONOHYDRATE, DEXTROAMPHETAMINE SULFATE AND AMPHETAMINE SULFATE 7.5; 7.5; 7.5; 7.5 MG/1; MG/1; MG/1; MG/1
30 CAPSULE, EXTENDED RELEASE ORAL EVERY MORNING
Qty: 30 CAPSULE | Refills: 0 | Status: SHIPPED | OUTPATIENT
Start: 2023-06-20

## 2023-06-20 RX ORDER — DEXTROAMPHETAMINE SACCHARATE, AMPHETAMINE ASPARTATE, DEXTROAMPHETAMINE SULFATE AND AMPHETAMINE SULFATE 5; 5; 5; 5 MG/1; MG/1; MG/1; MG/1
TABLET ORAL
Qty: 30 TABLET | Refills: 0 | Status: SHIPPED | OUTPATIENT
Start: 2023-06-20

## 2023-07-18 DIAGNOSIS — F90.9 ATTENTION DEFICIT HYPERACTIVITY DISORDER (ADHD), UNSPECIFIED ADHD TYPE: ICD-10-CM

## 2023-07-18 DIAGNOSIS — F98.8 ATTENTION DEFICIT DISORDER, UNSPECIFIED HYPERACTIVITY PRESENCE: ICD-10-CM

## 2023-07-21 RX ORDER — DEXTROAMPHETAMINE SACCHARATE, AMPHETAMINE ASPARTATE MONOHYDRATE, DEXTROAMPHETAMINE SULFATE AND AMPHETAMINE SULFATE 7.5; 7.5; 7.5; 7.5 MG/1; MG/1; MG/1; MG/1
30 CAPSULE, EXTENDED RELEASE ORAL EVERY MORNING
Qty: 30 CAPSULE | Refills: 0 | Status: SHIPPED | OUTPATIENT
Start: 2023-07-21

## 2023-07-21 RX ORDER — DEXTROAMPHETAMINE SACCHARATE, AMPHETAMINE ASPARTATE, DEXTROAMPHETAMINE SULFATE AND AMPHETAMINE SULFATE 5; 5; 5; 5 MG/1; MG/1; MG/1; MG/1
TABLET ORAL
Qty: 30 TABLET | Refills: 0 | Status: SHIPPED | OUTPATIENT
Start: 2023-07-21

## 2023-08-04 ENCOUNTER — OFFICE VISIT (OUTPATIENT)
Dept: URGENT CARE | Facility: MEDICAL CENTER | Age: 28
End: 2023-08-04
Payer: COMMERCIAL

## 2023-08-04 VITALS
DIASTOLIC BLOOD PRESSURE: 90 MMHG | OXYGEN SATURATION: 97 % | WEIGHT: 315 LBS | TEMPERATURE: 97.5 F | HEART RATE: 102 BPM | SYSTOLIC BLOOD PRESSURE: 142 MMHG | BODY MASS INDEX: 55.53 KG/M2

## 2023-08-04 DIAGNOSIS — L05.91 PILONIDAL CYST: Primary | ICD-10-CM

## 2023-08-04 PROCEDURE — 99214 OFFICE O/P EST MOD 30 MIN: CPT | Performed by: PHYSICIAN ASSISTANT

## 2023-08-04 RX ORDER — CEPHALEXIN 500 MG/1
500 CAPSULE ORAL EVERY 6 HOURS SCHEDULED
Qty: 28 CAPSULE | Refills: 0 | Status: SHIPPED | OUTPATIENT
Start: 2023-08-04 | End: 2023-08-11

## 2023-08-04 NOTE — PATIENT INSTRUCTIONS
Take Keflex as prescribed. Sitz baths as instructed, see attached additional information. Follow-up with PCP as scheduled. Schedule with general surgeon for excision immediately. If symptoms worsen, report to the emergency department immediately.

## 2023-08-04 NOTE — PROGRESS NOTES
[facility]        NAME: Debra Yepez is a 33 y.o. male  : 1995    MRN: [id number]  DATE: 2023  TIME: 4:42 PM    Assessment and Plan   Pilonidal cyst [L05.91]  1. Pilonidal cyst  cephalexin (KEFLEX) 500 mg capsule      Patient presents with pilonidal cyst recommend Keflex to treat along with sitz bath's. Patient Instructions   Patient Instructions   Take Keflex as prescribed. Sitz baths as instructed, see attached additional information. Follow-up with PCP as scheduled. Schedule with general surgeon for excision immediately. If symptoms worsen, report to the emergency department immediately. Follow up with PCP in 3-5 days. Proceed to  ER if symptoms worsen. Chief Complaint     Chief Complaint   Patient presents with   • Pilonidal Cyst     Recurrent worsening since this AM.          History of Present Illness        69-year-old male presents with complaint of pilonidal cyst.  He states that this is a recurrent issue for him. He states that the current cyst started giving him trouble at approximately 2 AM and has been gradually getting worse throughout the day. He normally sees his primary care doctor who will drain it. He has been referred to a general surgeon has been evaluated but has had difficulty scheduling surgery to have it excised. Review of Systems   Review of Systems   Constitutional: Negative for chills and fever.          Current Medications       Current Outpatient Medications:   •  cephalexin (KEFLEX) 500 mg capsule, Take 1 capsule (500 mg total) by mouth every 6 (six) hours for 7 days, Disp: 28 capsule, Rfl: 0  •  amphetamine-dextroamphetamine (ADDERALL XR, 30MG,) 30 MG 24 hr capsule, Take 1 capsule (30 mg total) by mouth every morning Max Daily Amount: 30 mg, Disp: 30 capsule, Rfl: 0  •  amphetamine-dextroamphetamine (ADDERALL, 20MG,) 20 mg tablet, Take one tab in afternoon for breakthrough symtpoms, Disp: 30 tablet, Rfl: 0    Current Allergies     Allergies as of 08/04/2023   • (No Known Allergies)            The following portions of the patient's history were reviewed and updated as appropriate: allergies, current medications, past family history, past medical history, past social history, past surgical history and problem list.     Past Medical History:   Diagnosis Date   • Herpes simplex type 1 infection     last assessed 04/07/2014   • Lymphadenitis, chronic     resolved 04/07/2014   • Melanoma ([address])     back   • Viral warts     last assessed 04/07/14       Past Surgical History:   Procedure Laterality Date   • MASTECTOMY PARTIAL / LUMPECTOMY W/ AXILLARY LYMPHADENECTOMY      resolved 83/56/7107   • UMBILICAL HERNIA REPAIR         Family History   Problem Relation Age of Onset   • Breast cancer Mother    • Diabetes Maternal Grandmother    • Heart disease Paternal Grandfather    • Hypertension Paternal Grandfather    • Cancer Family          Medications have been verified. Objective   /90   Pulse 102   Temp 97.5 °F (36.4 °C)   Wt (!) 176 kg (387 lb)   SpO2 97%   BMI 55.53 kg/m²   No LMP for male patient. Physical Exam     Physical Exam  Vitals and nursing note reviewed. Constitutional:       General: He is awake. He is not in acute distress. Appearance: Normal appearance. He is well-developed and well-groomed. He is not ill-appearing, toxic-appearing or diaphoretic. HENT:      Head: Normocephalic and atraumatic. Right Ear: Hearing and external ear normal.      Left Ear: Hearing and external ear normal.   Eyes:      General: Lids are normal. Vision grossly intact. Gaze aligned appropriately. Cardiovascular:      Rate and Rhythm: Normal rate. Pulmonary:      Effort: Pulmonary effort is normal.      Comments: Patient is speaking in full sentences with no increased respiratory effort. No audible wheezing or stridor. Musculoskeletal:      Cervical back: Normal range of motion.         Back:       Comments: 3 cm x 2 cm pilonidal cyst with exquisite tenderness to palpation. There is mild erythema overlying the cyst.  Fluctuance present. Skin:     General: Skin is warm and dry. Neurological:      Mental Status: He is alert and oriented to person, place, and time. Coordination: Coordination is intact. Gait: Gait is intact. Psychiatric:         Attention and Perception: Attention and perception normal.         Mood and Affect: Mood and affect normal.         Speech: Speech normal.         Behavior: Behavior normal. Behavior is cooperative. Note: Portions of this record may have been created with voice recognition software. Occasional wrong word or "sound a like" substitutions may have occurred due to the inherent limitations of voice recognition software. Please read the chart carefully and recognize, using context, where substitutions have occurred. *

## 2023-08-24 ENCOUNTER — OFFICE VISIT (OUTPATIENT)
Dept: URGENT CARE | Facility: MEDICAL CENTER | Age: 28
End: 2023-08-24
Payer: COMMERCIAL

## 2023-08-24 VITALS
RESPIRATION RATE: 18 BRPM | WEIGHT: 315 LBS | BODY MASS INDEX: 45.1 KG/M2 | SYSTOLIC BLOOD PRESSURE: 122 MMHG | DIASTOLIC BLOOD PRESSURE: 80 MMHG | OXYGEN SATURATION: 99 % | HEART RATE: 90 BPM | HEIGHT: 70 IN | TEMPERATURE: 98 F

## 2023-08-24 DIAGNOSIS — F90.9 ATTENTION DEFICIT HYPERACTIVITY DISORDER (ADHD), UNSPECIFIED ADHD TYPE: ICD-10-CM

## 2023-08-24 DIAGNOSIS — Z23 NEED FOR TDAP VACCINATION: Primary | ICD-10-CM

## 2023-08-24 DIAGNOSIS — S61.219A LACERATION WITHOUT FOREIGN BODY OF UNSPECIFIED FINGER WITHOUT DAMAGE TO NAIL, INITIAL ENCOUNTER: ICD-10-CM

## 2023-08-24 DIAGNOSIS — F98.8 ATTENTION DEFICIT DISORDER, UNSPECIFIED HYPERACTIVITY PRESENCE: ICD-10-CM

## 2023-08-24 PROCEDURE — 12002 RPR S/N/AX/GEN/TRNK2.6-7.5CM: CPT | Performed by: PHYSICIAN ASSISTANT

## 2023-08-24 PROCEDURE — 90471 IMMUNIZATION ADMIN: CPT | Performed by: PHYSICIAN ASSISTANT

## 2023-08-24 PROCEDURE — 90715 TDAP VACCINE 7 YRS/> IM: CPT

## 2023-08-24 PROCEDURE — 99213 OFFICE O/P EST LOW 20 MIN: CPT | Performed by: PHYSICIAN ASSISTANT

## 2023-08-24 NOTE — TELEPHONE ENCOUNTER
1 [id number] 07/21/2023 07/21/2023 Amphetamine Salt Combo (Tablet) 30.0 30 20 MG NA [id number] Commercial [place] 0 / 0 [place]    1 [id number] 07/21/2023 07/21/2023  30.0 30  NA [facility][id number] Commercial Insurance 0 / 0 [place]    1 [id number] 06/20/2023 06/20/2023 Mixed Amphetamine Salt (Capsule, Extended Release) 30.0 30 7.5 MG / 7.5 MG / 7.5 MG / 7.5 MG NA [facility]

## 2023-08-24 NOTE — PATIENT INSTRUCTIONS
1. Keep skin clean and dry  2. Motrin as needed for pain  3. Watch for S&S of infection (redness, swelling, drainage, fever)  4.  Suture removal in 7-10 days Doxycycline Pregnancy And Lactation Text: This medication is Pregnancy Category D and not consider safe during pregnancy. It is also excreted in breast milk but is considered safe for shorter treatment courses.

## 2023-08-24 NOTE — PROGRESS NOTES
[facility]        NAME: Laila Hastings is a 33 y.o. male  : 1995    MRN: [id number]  DATE: 2023  TIME: 6:28 PM    Assessment and Plan   Need for Tdap vaccination [Z23]  1. Need for Tdap vaccination  Tdap Vaccine greater than or equal to 8yo      2. Laceration without foreign body of unspecified finger without damage to nail, initial encounter  Laceration repair            Patient Instructions     1. Keep skin clean and dry  2. Motrin as needed for pain  3. Watch for S&S of infection (redness, swelling, drainage, fever)  4. Suture removal in 7-10 days      Chief Complaint     Chief Complaint   Patient presents with   • Laceration     Laceration to left second digit on brand new saw lakeisha blade          History of Present Illness       Dejuan Murillo a 81-year-old male who presents with a laceration to the tip of his left index finger after home accident earlier today. Patient was using a saws all when the blade slipped and struck him on the medial aspect of his left third finger. Patient was able to clean and irrigate the wound but it continued to bleed. He is unaware of his last tetanus vaccine. Laceration         Review of Systems   Review of Systems   Constitutional: Negative. Musculoskeletal: Negative. Skin: Positive for wound. Negative for color change. Neurological: Negative for weakness and numbness.          Current Medications       Current Outpatient Medications:   •  amphetamine-dextroamphetamine (ADDERALL XR, 30MG,) 30 MG 24 hr capsule, Take 1 capsule (30 mg total) by mouth every morning Max Daily Amount: 30 mg, Disp: 30 capsule, Rfl: 0  •  amphetamine-dextroamphetamine (ADDERALL, 20MG,) 20 mg tablet, Take one tab in afternoon for breakthrough symtpoms, Disp: 30 tablet, Rfl: 0    Current Allergies     Allergies as of 2023   • (No Known Allergies)            The following portions of the patient's history were reviewed and updated as appropriate: allergies, current medications, past family history, past medical history, past social history, past surgical history and problem list.     Past Medical History:   Diagnosis Date   • Herpes simplex type 1 infection     last assessed 04/07/2014   • Lymphadenitis, chronic     resolved 04/07/2014   • Melanoma ([address])     back   • Viral warts     last assessed 04/07/14       Past Surgical History:   Procedure Laterality Date   • MASTECTOMY PARTIAL / LUMPECTOMY W/ AXILLARY LYMPHADENECTOMY      resolved 12/70/4988   • UMBILICAL HERNIA REPAIR         Family History   Problem Relation Age of Onset   • Breast cancer Mother    • Diabetes Maternal Grandmother    • Heart disease Paternal Grandfather    • Hypertension Paternal Grandfather    • Cancer Family          Medications have been verified. Objective   /80   Pulse 90   Temp 98 °F (36.7 °C) (Temporal)   Resp 18   Ht 5' 10" (1.778 m)   Wt (!) 176 kg (387 lb)   SpO2 99%   BMI 55.53 kg/m²   No LMP for male patient. Physical Exam     Physical Exam  Constitutional:       General: He is not in acute distress. Appearance: Normal appearance. He is not ill-appearing. Cardiovascular:      Rate and Rhythm: Normal rate and regular rhythm. Heart sounds: Normal heart sounds. No murmur heard. Pulmonary:      Effort: Pulmonary effort is normal.      Breath sounds: Normal breath sounds. Musculoskeletal:      Comments: DIP and PIP joint function intact   Skin:     Comments: There is a 6 cm laceration noted of the medial border of the left index finger over the region of the mid phalanx. Skin edges well approximated with active bleeding. Neurological:      Mental Status: He is alert.        Universal Protocol:  Consent given by: patient  Patient understanding: patient states understanding of the procedure being performed    Laceration repair    Date/Time: 8/24/2023 5:00 PM    Performed by: Dasia Florentino PA-C  Authorized by: Dasia Florentino PA-C  Anesthesia: local infiltration    Anesthesia:  Local Anesthetic: lidocaine 1% without epinephrine  Anesthetic total: 2 mL    Wound Dehiscence:  Superficial Wound Dehiscence: simple closure      Procedure Details:  Irrigation solution: saline  Debridement: none  Degree of undermining: none  Skin closure: 5-0 nylon  Number of sutures: 6  Approximation: close  Approximation difficulty: simple  Dressing: antibiotic ointment and gauze packing  Patient tolerance: patient tolerated the procedure well with no immediate complications

## 2023-08-25 RX ORDER — DEXTROAMPHETAMINE SACCHARATE, AMPHETAMINE ASPARTATE, DEXTROAMPHETAMINE SULFATE AND AMPHETAMINE SULFATE 5; 5; 5; 5 MG/1; MG/1; MG/1; MG/1
TABLET ORAL
Qty: 30 TABLET | Refills: 0 | Status: SHIPPED | OUTPATIENT
Start: 2023-08-25

## 2023-08-25 RX ORDER — DEXTROAMPHETAMINE SACCHARATE, AMPHETAMINE ASPARTATE MONOHYDRATE, DEXTROAMPHETAMINE SULFATE AND AMPHETAMINE SULFATE 7.5; 7.5; 7.5; 7.5 MG/1; MG/1; MG/1; MG/1
30 CAPSULE, EXTENDED RELEASE ORAL EVERY MORNING
Qty: 30 CAPSULE | Refills: 0 | Status: SHIPPED | OUTPATIENT
Start: 2023-08-25

## 2023-08-28 ENCOUNTER — APPOINTMENT (OUTPATIENT)
Dept: LAB | Facility: CLINIC | Age: 28
End: 2023-08-28
Payer: COMMERCIAL

## 2023-08-28 ENCOUNTER — OFFICE VISIT (OUTPATIENT)
Dept: FAMILY MEDICINE CLINIC | Facility: CLINIC | Age: 28
End: 2023-08-28
Payer: COMMERCIAL

## 2023-08-28 VITALS
BODY MASS INDEX: 45.1 KG/M2 | HEART RATE: 80 BPM | SYSTOLIC BLOOD PRESSURE: 118 MMHG | TEMPERATURE: 97.3 F | HEIGHT: 70 IN | DIASTOLIC BLOOD PRESSURE: 78 MMHG | OXYGEN SATURATION: 98 % | WEIGHT: 315 LBS

## 2023-08-28 DIAGNOSIS — R63.5 WEIGHT GAIN: ICD-10-CM

## 2023-08-28 DIAGNOSIS — Z00.00 ANNUAL PHYSICAL EXAM: Primary | ICD-10-CM

## 2023-08-28 DIAGNOSIS — Z00.00 ANNUAL PHYSICAL EXAM: ICD-10-CM

## 2023-08-28 DIAGNOSIS — L05.91 PILONIDAL CYST: ICD-10-CM

## 2023-08-28 DIAGNOSIS — M79.631 RIGHT FOREARM PAIN: ICD-10-CM

## 2023-08-28 DIAGNOSIS — R29.898 RIGHT HAND WEAKNESS: ICD-10-CM

## 2023-08-28 LAB
ALBUMIN SERPL BCP-MCNC: 4.4 G/DL (ref 3.5–5)
ALP SERPL-CCNC: 47 U/L (ref 34–104)
ALT SERPL W P-5'-P-CCNC: 149 U/L (ref 7–52)
ANION GAP SERPL CALCULATED.3IONS-SCNC: 8 MMOL/L
AST SERPL W P-5'-P-CCNC: 70 U/L (ref 13–39)
BILIRUB SERPL-MCNC: 0.53 MG/DL (ref 0.2–1)
BUN SERPL-MCNC: 21 MG/DL (ref 5–25)
CALCIUM SERPL-MCNC: 9.3 MG/DL (ref 8.4–10.2)
CHLORIDE SERPL-SCNC: 103 MMOL/L (ref 96–108)
CHOLEST SERPL-MCNC: 165 MG/DL
CO2 SERPL-SCNC: 28 MMOL/L (ref 21–32)
CREAT SERPL-MCNC: 0.95 MG/DL (ref 0.6–1.3)
GFR SERPL CREATININE-BSD FRML MDRD: 110 ML/MIN/1.73SQ M
GLUCOSE P FAST SERPL-MCNC: 85 MG/DL (ref 65–99)
HDLC SERPL-MCNC: 51 MG/DL
LDLC SERPL CALC-MCNC: 97 MG/DL (ref 0–100)
NONHDLC SERPL-MCNC: 114 MG/DL
POTASSIUM SERPL-SCNC: 4.1 MMOL/L (ref 3.5–5.3)
PROT SERPL-MCNC: 7.4 G/DL (ref 6.4–8.4)
SODIUM SERPL-SCNC: 139 MMOL/L (ref 135–147)
TRIGL SERPL-MCNC: 84 MG/DL
TSH SERPL DL<=0.05 MIU/L-ACNC: 2.06 UIU/ML (ref 0.45–4.5)

## 2023-08-28 PROCEDURE — 80061 LIPID PANEL: CPT

## 2023-08-28 PROCEDURE — 36415 COLL VENOUS BLD VENIPUNCTURE: CPT

## 2023-08-28 PROCEDURE — 99395 PREV VISIT EST AGE 18-39: CPT | Performed by: FAMILY MEDICINE

## 2023-08-28 PROCEDURE — 80053 COMPREHEN METABOLIC PANEL: CPT

## 2023-08-28 PROCEDURE — 84443 ASSAY THYROID STIM HORMONE: CPT

## 2023-08-28 PROCEDURE — 99214 OFFICE O/P EST MOD 30 MIN: CPT | Performed by: FAMILY MEDICINE

## 2023-08-28 NOTE — PROGRESS NOTES
ADULT ANNUAL [facility]    NAME: Debbie Hood  AGE: 33 y.o. SEX: male  : 1995     DATE: 2023     Assessment and Plan:     Problem List Items Addressed This Visit    None      Immunizations and preventive care screenings were discussed with patient today. Appropriate education was printed on patient's after visit summary. Counseling:  · Exercise: the importance of regular exercise/physical activity was discussed. Recommend exercise 3-5 times per week for at least 30 minutes. No follow-ups on file. Chief Complaint:     Chief Complaint   Patient presents with   • Annual Exam      History of Present Illness:     Adult Annual Physical   Patient here for a comprehensive physical exam. The patient reports no problems. Diet and Physical Activity  · Diet/Nutrition: well balanced diet. · Exercise: walking. Depression Screening  PHQ-2/9 Depression Screening         General Health  · Sleep: sleeps well. · Hearing: normal - bilateral.  · Vision: no vision problems. · Dental: regular dental visits.  Health  · History of STDs?: yes. Review of Systems:     Review of Systems   Constitutional: Positive for unexpected weight change. Negative for appetite change, chills and fever. Positive unexplained weight gain   HENT: Negative for ear pain, facial swelling, rhinorrhea, sinus pain, sore throat and trouble swallowing. Eyes: Negative for discharge and redness. Respiratory: Negative for chest tightness, shortness of breath and wheezing. Cardiovascular: Negative for chest pain and palpitations. Gastrointestinal: Negative for abdominal pain, diarrhea, nausea and vomiting. Endocrine: Negative for polyuria. Genitourinary: Negative for dysuria and urgency.    Musculoskeletal: Negative for arthralgias and back pain.        + Right forearm pain  Positive right hand weakness   Skin: Negative for rash.        Positive pilonidal cyst without erythema or tenderness   Neurological: Negative for dizziness, weakness and headaches. Hematological: Negative for adenopathy. Psychiatric/Behavioral: Negative for behavioral problems, confusion and sleep disturbance. All other systems reviewed and are negative.      Past Medical History:     Past Medical History:   Diagnosis Date   • Herpes simplex type 1 infection     last assessed 04/07/2014   • Lymphadenitis, chronic     resolved 04/07/2014   • Melanoma ([address])     back   • Viral warts     last assessed 04/07/14      Past Surgical History:     Past Surgical History:   Procedure Laterality Date   • MASTECTOMY PARTIAL / LUMPECTOMY W/ AXILLARY LYMPHADENECTOMY      resolved 44/05/6234   • UMBILICAL HERNIA REPAIR        Social History:     Social History     Socioeconomic History   • Marital status: Single     Spouse name: Not on file   • Number of children: Not on file   • Years of education: Not on file   • Highest education level: Not on file   Occupational History   • Not on file   Tobacco Use   • Smoking status: Never   • Smokeless tobacco: Never   Vaping Use   • Vaping Use: Never used   Substance and Sexual Activity   • Alcohol use: Yes     Comment: social   • Drug use: No   • Sexual activity: Not on file   Other Topics Concern   • Not on file   Social History Narrative   • Not on file     Social Determinants of Health     Financial Resource Strain: Not on file   Food Insecurity: Not on file   Transportation Needs: Not on file   Physical Activity: Not on file   Stress: Not on file   Social Connections: Not on file   Intimate Partner Violence: Not on file   Housing Stability: Not on file      Family History:     Family History   Problem Relation Age of Onset   • Breast cancer Mother    • Diabetes Maternal Grandmother    • Heart disease Paternal Grandfather    • Hypertension Paternal Grandfather    • Cancer Family       Current Medications:     Current Outpatient Medications   Medication Sig Dispense Refill   • amphetamine-dextroamphetamine (ADDERALL XR, 30MG,) 30 MG 24 hr capsule Take 1 capsule (30 mg total) by mouth every morning Max Daily Amount: 30 mg 30 capsule 0   • amphetamine-dextroamphetamine (ADDERALL, 20MG,) 20 mg tablet Take one tab in afternoon for breakthrough symtpoms 30 tablet 0     No current facility-administered medications for this visit. Allergies:     No Known Allergies   Physical Exam:     There were no vitals taken for this visit. Physical Exam  Vitals and nursing note reviewed. Constitutional:       General: He is not in acute distress. Appearance: Normal appearance. He is not ill-appearing or diaphoretic. HENT:      Head: Normocephalic and atraumatic. Right Ear: Tympanic membrane, ear canal and external ear normal.      Left Ear: Tympanic membrane, ear canal and external ear normal.      Nose: Nose normal. No congestion or rhinorrhea. Mouth/Throat:      Mouth: Mucous membranes are moist.      Pharynx: Oropharynx is clear. No posterior oropharyngeal erythema. Eyes:      General:         Right eye: No discharge. Left eye: No discharge. Extraocular Movements: Extraocular movements intact. Conjunctiva/sclera: Conjunctivae normal.      Pupils: Pupils are equal, round, and reactive to light. Neck:      Vascular: No carotid bruit. Cardiovascular:      Rate and Rhythm: Normal rate and regular rhythm. Pulses: Normal pulses. Heart sounds: Normal heart sounds. No murmur heard. Pulmonary:      Effort: Pulmonary effort is normal. No respiratory distress. Breath sounds: Normal breath sounds. No wheezing or rhonchi. Abdominal:      General: Abdomen is flat. Bowel sounds are normal. There is no distension. Palpations: There is no mass. Tenderness: There is no abdominal tenderness. Musculoskeletal:         General: Tenderness present. No swelling or deformity. Normal range of motion. Cervical back: Normal range of motion and neck supple. No rigidity. Right lower leg: No edema. Left lower leg: No edema. Comments: Positive tenderness of the right forearm at olecranon while palpating over the groove of the ulnar nerve positive right hand weakness+2/4 right and +4/4 on left     Lymphadenopathy:      Cervical: No cervical adenopathy. Skin:     General: Skin is warm and dry. Capillary Refill: Capillary refill takes less than 2 seconds. Coloration: Skin is not jaundiced. Findings: No bruising, erythema or rash. Comments: Positive pilonidal cyst   Neurological:      General: No focal deficit present. Mental Status: He is alert and oriented to person, place, and time. Cranial Nerves: No cranial nerve deficit. Sensory: No sensory deficit. Gait: Gait normal.      Deep Tendon Reflexes: Reflexes normal.   Psychiatric:         Mood and Affect: Mood normal.         Behavior: Behavior normal.         Thought Content:  Thought content normal.         Judgment: Judgment normal.          [facility], [address] [facility]

## 2023-08-29 ENCOUNTER — TELEPHONE (OUTPATIENT)
Age: 28
End: 2023-08-29

## 2023-08-29 ENCOUNTER — TELEPHONE (OUTPATIENT)
Dept: FAMILY MEDICINE CLINIC | Facility: CLINIC | Age: 28
End: 2023-08-29

## 2023-08-29 NOTE — TELEPHONE ENCOUNTER
Patient is being referred to a orthopedics. Please schedule accordingly.     [address] [facility]   [phone]

## 2023-08-29 NOTE — TELEPHONE ENCOUNTER
Ariela Liao from [facility] called ([phone]) regarding ultrasound for forearm pain and right hand weakness. She needs clarification on what they are evaluating for.

## 2023-08-31 ENCOUNTER — PREP FOR PROCEDURE (OUTPATIENT)
Dept: SURGERY | Facility: CLINIC | Age: 28
End: 2023-08-31

## 2023-08-31 ENCOUNTER — OFFICE VISIT (OUTPATIENT)
Dept: SURGERY | Facility: CLINIC | Age: 28
End: 2023-08-31
Payer: COMMERCIAL

## 2023-08-31 VITALS — HEIGHT: 71 IN | BODY MASS INDEX: 44.1 KG/M2 | WEIGHT: 315 LBS

## 2023-08-31 DIAGNOSIS — L05.91 PILONIDAL CYST: Primary | ICD-10-CM

## 2023-08-31 DIAGNOSIS — L05.91 PILONIDAL CYST: ICD-10-CM

## 2023-08-31 PROCEDURE — 99244 OFF/OP CNSLTJ NEW/EST MOD 40: CPT | Performed by: SURGERY

## 2023-08-31 NOTE — PROGRESS NOTES
Assessment/Plan:    Diagnoses and all orders for this visit:    Pilonidal cyst  -     Ambulatory Referral to General Surgery    Discussed risks and benefits of a pilonidal cystectomy including the potential for recurrence or wound healing issues and he agrees to proceed. Subjective:      Patient ID: Fidelia Ornelas is a 33 y.o. male. Patient presents for evaluation of a pilonidal cyst.  States he has had the cyst for 1 1/2 years. He has had 3 I&D's at [facility].  He works at a [employer]. He is quite active tends to put some pressure on it. Last time he was evaluated at an urgent center, he was given antibiotics but it spontaneously drained on its own. He now desires removal as it seems to be getting more of some      The following portions of the patient's history were reviewed and updated as appropriate:     He  has a past medical history of Herpes simplex type 1 infection, Lymphadenitis, chronic, Melanoma ([address]), and Viral warts. He  has a past surgical history that includes Umbilical hernia repair and Mastectomy partial / lumpectomy w/ axillary lymphadenectomy. His family history includes Breast cancer in his mother; Cancer in his family; Diabetes in his maternal grandmother; Heart disease in his paternal grandfather; Hypertension in his paternal grandfather. He  reports that he has never smoked. He has never used smokeless tobacco. He reports current alcohol use. He reports that he does not use drugs. Current Outpatient Medications   Medication Sig Dispense Refill   • amphetamine-dextroamphetamine (ADDERALL XR, 30MG,) 30 MG 24 hr capsule Take 1 capsule (30 mg total) by mouth every morning Max Daily Amount: 30 mg 30 capsule 0   • amphetamine-dextroamphetamine (ADDERALL, 20MG,) 20 mg tablet Take one tab in afternoon for breakthrough symtpoms 30 tablet 0     No current facility-administered medications for this visit. He has No Known Allergies. .    Review of Systems   All other systems reviewed and are negative. Objective:      Ht 5' 11" (1.803 m)   Wt (!) 177 kg (390 lb)   BMI 54.39 kg/m²          Physical Exam  Constitutional:       Appearance: He is obese. HENT:      Head: Atraumatic. Mouth/Throat:      Mouth: Mucous membranes are moist.   Eyes:      Extraocular Movements: Extraocular movements intact. Cardiovascular:      Rate and Rhythm: Normal rate. Pulmonary:      Effort: Pulmonary effort is normal.   Abdominal:      General: Bowel sounds are normal.   Musculoskeletal:      Cervical back: Normal range of motion. Skin:     General: Skin is warm and dry. Comments: Evidence of a pilonidal cyst opening with a small granular tract just superior to the midline cleft. No active infection presently   Neurological:      Mental Status: He is alert.        Extremities: No edema

## 2023-08-31 NOTE — LETTER
August 31, 2023     [facility] [address] [address] [facility]    Patient: Celi Shaffer   YOB: 1995   Date of Visit: 8/31/2023       Dear Dr. Swapna Lubin: Thank you for referring Wan Navarro to me for evaluation. Below are my notes for this consultation. If you have questions, please do not hesitate to call me. I look forward to following your patient along with you. Sincerely,        Cira Hubbard,         CC: No Recipients    Cira vo DO  8/31/2023 11:23 AM  Sign when Signing Visit  Assessment/Plan:    Diagnoses and all orders for this visit:    Pilonidal cyst  -     Ambulatory Referral to General Surgery    Discussed risks and benefits of a pilonidal cystectomy including the potential for recurrence or wound healing issues and he agrees to proceed. Subjective:     Patient ID: Celi Shaffer is a 33 y.o. male. Patient presents for evaluation of a pilonidal cyst.  States he has had the cyst for 1 1/2 years. He has had 3 I&D's at [facility].  He works at a cement factory. He is quite active tends to put some pressure on it. Last time he was evaluated at an urgent center, he was given antibiotics but it spontaneously drained on its own. He now desires removal as it seems to be getting more of some      The following portions of the patient's history were reviewed and updated as appropriate:     He  has a past medical history of Herpes simplex type 1 infection, Lymphadenitis, chronic, Melanoma ([address]), and Viral warts. He  has a past surgical history that includes Umbilical hernia repair and Mastectomy partial / lumpectomy w/ axillary lymphadenectomy. His family history includes Breast cancer in his mother; Cancer in his family; Diabetes in his maternal grandmother; Heart disease in his paternal grandfather; Hypertension in his paternal grandfather. He  reports that he has never smoked.  He has never used smokeless tobacco. He reports current alcohol use. He reports that he does not use drugs. Current Outpatient Medications   Medication Sig Dispense Refill   • amphetamine-dextroamphetamine (ADDERALL XR, 30MG,) 30 MG 24 hr capsule Take 1 capsule (30 mg total) by mouth every morning Max Daily Amount: 30 mg 30 capsule 0   • amphetamine-dextroamphetamine (ADDERALL, 20MG,) 20 mg tablet Take one tab in afternoon for breakthrough symtpoms 30 tablet 0     No current facility-administered medications for this visit. He has No Known Allergies. .    Review of Systems   All other systems reviewed and are negative. Objective:      Ht 5' 11" (1.803 m)   Wt (!) 177 kg (390 lb)   BMI 54.39 kg/m²         Physical Exam  Constitutional:       Appearance: He is obese. HENT:      Head: Atraumatic. Mouth/Throat:      Mouth: Mucous membranes are moist.   Eyes:      Extraocular Movements: Extraocular movements intact. Cardiovascular:      Rate and Rhythm: Normal rate. Pulmonary:      Effort: Pulmonary effort is normal.   Abdominal:      General: Bowel sounds are normal.   Musculoskeletal:      Cervical back: Normal range of motion. Skin:     General: Skin is warm and dry. Comments: Evidence of a pilonidal cyst opening with a small granular tract just superior to the midline cleft. No active infection presently   Neurological:      Mental Status: He is alert.       Extremities: No edema

## 2023-09-01 ENCOUNTER — OFFICE VISIT (OUTPATIENT)
Dept: FAMILY MEDICINE CLINIC | Facility: CLINIC | Age: 28
End: 2023-09-01
Payer: COMMERCIAL

## 2023-09-01 VITALS
SYSTOLIC BLOOD PRESSURE: 122 MMHG | BODY MASS INDEX: 44.1 KG/M2 | HEIGHT: 71 IN | WEIGHT: 315 LBS | DIASTOLIC BLOOD PRESSURE: 80 MMHG | TEMPERATURE: 97.6 F

## 2023-09-01 DIAGNOSIS — F98.8 ATTENTION DEFICIT DISORDER (ADD) WITHOUT HYPERACTIVITY: Primary | ICD-10-CM

## 2023-09-01 PROCEDURE — 99213 OFFICE O/P EST LOW 20 MIN: CPT | Performed by: FAMILY MEDICINE

## 2023-09-05 NOTE — PROGRESS NOTES
Patient ID: Fidelia Ornelas is a 33 y.o. male. HPI: 33 y.o.male presents for evaluation of ADD. He has been having an issue for a long time being able to focus and complete tasks. He states this has been happening since high school.     SUBJECTIVE    Family History   Problem Relation Age of Onset   • Breast cancer Mother    • Diabetes Maternal Grandmother    • Heart disease Paternal Grandfather    • Hypertension Paternal Grandfather    • Cancer Family      Social History     Socioeconomic History   • Marital status: Single     Spouse name: Not on file   • Number of children: Not on file   • Years of education: Not on file   • Highest education level: Not on file   Occupational History   • Not on file   Tobacco Use   • Smoking status: Never   • Smokeless tobacco: Never   Vaping Use   • Vaping Use: Never used   Substance and Sexual Activity   • Alcohol use: Yes     Comment: social   • Drug use: No   • Sexual activity: Not on file   Other Topics Concern   • Not on file   Social History Narrative   • Not on file     Social Determinants of Health     Financial Resource Strain: Not on file   Food Insecurity: Not on file   Transportation Needs: Not on file   Physical Activity: Not on file   Stress: Not on file   Social Connections: Not on file   Intimate Partner Violence: Not on file   Housing Stability: Not on file     Past Medical History:   Diagnosis Date   • Herpes simplex type 1 infection     last assessed 04/07/2014   • Lymphadenitis, chronic     resolved 04/07/2014   • Melanoma ([address])     back   • Viral warts     last assessed 04/07/14     Past Surgical History:   Procedure Laterality Date   • MASTECTOMY PARTIAL / LUMPECTOMY W/ AXILLARY LYMPHADENECTOMY      resolved 77/07/4981   • UMBILICAL HERNIA REPAIR       No Known Allergies    Current Outpatient Medications:   •  amphetamine-dextroamphetamine (ADDERALL XR, 30MG,) 30 MG 24 hr capsule, Take 1 capsule (30 mg total) by mouth every morning Max Daily Amount: 30 mg, Disp: 30 capsule, Rfl: 0  •  amphetamine-dextroamphetamine (ADDERALL, 20MG,) 20 mg tablet, Take one tab in afternoon for breakthrough symtpoms, Disp: 30 tablet, Rfl: 0    Review of Systems  Constitutional:     Denies fever, chills ,fatigue ,weakness ,weight loss, weight gain     ENT: Denies earache ,loss of hearing ,nosebleed, nasal discharge,nasal congestion ,sore throat ,hoarseness  Pulmonary: Denies shortness of breath ,cough  ,dyspnea on exertion, orthopnea  ,PND   Cardiovascular:  Denies bradycardia , tachycardia  ,palpations, lower extremity edema leg, claudication  Breast:  Denies new or changing breast lumps ,nipple discharge ,nipple changes  Abdomen:  Denies abdominal pain , anorexia , indigestion, nausea, vomiting, constipation, diarrhea  Musculoskeletal: Denies myalgias, arthralgias, joint swelling, joint stiffness , limb pain, limb swelling  Gu: denies dysuria, polyuria  Skin: Denies skin rash, skin lesion, skin wound, itching, dry skin  Neuro: Denies headache, numbness, tingling, confusion, loss of consciousness, dizziness, vertigo  Psychiatric: Denies feelings of depression, suicidal ideation, anxiety, sleep disturbances positive difficulty focusing and completing tasks. OBJECTIVE  /80   Temp 97.6 °F (36.4 °C)   Ht 5' 11" (1.803 m)   Wt (!) 174 kg (384 lb 4.8 oz)   BMI 53.60 kg/m²   Constitutional:   NAD, well appearing and well nourished      ENT:   Conjunctiva and lids: no injection, edema, or discharge     Pupils and iris: RUI bilaterally    External inspection of ears and nose: normal without deformities or discharge. Otoscopic exam: Canals patent without erythema. Nasal mucosa, septum and turbinates: Normal or edema or discharge         Oropharynx:  Moist mucosa, normal tongue and tonsils without lesions. No erythema        Pulmonary:Respiratory effort normal rate and rhythm, no increased work of breathing.  Auscultation of lungs:  Clear bilaterally with no adventitious breath sounds       Cardiovascular: regular rate and rhythm, S1 and S2, no murmur, no edema and/or varicosities of LE      Abdomen: Soft and non-distended     Positive bowel sounds      No heptomegaly or splenomegaly      Gu: no suprapubic tenderness or CVA tenderness, no urethral discharge  Lymphatic:  No anterior or posterior cervical lymphadenopathy         Musculoskeletal:  Gait and station: Normal gait      Digits and nails normal without clubbing or cyanosis       Inspection/palpation of joints, bones, and muscles:  No joint tenderness, swelling, full active and passive range of motion       Skin: Normal skin turgor and no rashes      Neuro:      Normal reflexes     Psych:   alert and oriented to person, place and time     normal mood and affect       Assessment/Plan:Diagnoses and all orders for this visit:    Attention deficit disorder (ADD) without hyperactivity  Comments:  Continue current therapy. Reviewed with patient plan to treat with above plan.     Patient instructed to call in 72 hours if not feeling better or if symptoms worsen

## 2023-09-11 DIAGNOSIS — K76.0 FATTY LIVER: Primary | ICD-10-CM

## 2023-09-25 DIAGNOSIS — F90.9 ATTENTION DEFICIT HYPERACTIVITY DISORDER (ADHD), UNSPECIFIED ADHD TYPE: ICD-10-CM

## 2023-09-25 DIAGNOSIS — F98.8 ATTENTION DEFICIT DISORDER, UNSPECIFIED HYPERACTIVITY PRESENCE: ICD-10-CM

## 2023-09-26 RX ORDER — DEXTROAMPHETAMINE SACCHARATE, AMPHETAMINE ASPARTATE, DEXTROAMPHETAMINE SULFATE AND AMPHETAMINE SULFATE 5; 5; 5; 5 MG/1; MG/1; MG/1; MG/1
TABLET ORAL
Qty: 30 TABLET | Refills: 0 | Status: SHIPPED | OUTPATIENT
Start: 2023-09-26

## 2023-09-26 RX ORDER — DEXTROAMPHETAMINE SACCHARATE, AMPHETAMINE ASPARTATE MONOHYDRATE, DEXTROAMPHETAMINE SULFATE AND AMPHETAMINE SULFATE 7.5; 7.5; 7.5; 7.5 MG/1; MG/1; MG/1; MG/1
30 CAPSULE, EXTENDED RELEASE ORAL EVERY MORNING
Qty: 30 CAPSULE | Refills: 0 | Status: SHIPPED | OUTPATIENT
Start: 2023-09-26

## 2023-10-20 ENCOUNTER — HOSPITAL ENCOUNTER (OUTPATIENT)
Dept: RADIOLOGY | Facility: HOSPITAL | Age: 28
Discharge: HOME | End: 2023-10-20
Attending: FAMILY MEDICINE
Payer: COMMERCIAL

## 2023-10-20 ENCOUNTER — HOSPITAL ENCOUNTER (OUTPATIENT)
Dept: RADIOLOGY | Facility: HOSPITAL | Age: 28
Discharge: HOME | End: 2023-10-20
Payer: COMMERCIAL

## 2023-10-20 DIAGNOSIS — G56.21 CUBITAL TUNNEL SYNDROME ON RIGHT: ICD-10-CM

## 2023-10-20 DIAGNOSIS — G56.01 CARPAL TUNNEL SYNDROME OF RIGHT WRIST: ICD-10-CM

## 2023-10-20 PROCEDURE — 76882 US LMTD JT/FCL EVL NVASC XTR: CPT

## 2023-10-24 DIAGNOSIS — F90.9 ATTENTION DEFICIT HYPERACTIVITY DISORDER (ADHD), UNSPECIFIED ADHD TYPE: ICD-10-CM

## 2023-10-24 DIAGNOSIS — F98.8 ATTENTION DEFICIT DISORDER, UNSPECIFIED HYPERACTIVITY PRESENCE: ICD-10-CM

## 2023-10-24 RX ORDER — DEXTROAMPHETAMINE SACCHARATE, AMPHETAMINE ASPARTATE MONOHYDRATE, DEXTROAMPHETAMINE SULFATE AND AMPHETAMINE SULFATE 7.5; 7.5; 7.5; 7.5 MG/1; MG/1; MG/1; MG/1
30 CAPSULE, EXTENDED RELEASE ORAL EVERY MORNING
Qty: 30 CAPSULE | Refills: 0 | Status: SHIPPED | OUTPATIENT
Start: 2023-10-24

## 2023-10-24 RX ORDER — DEXTROAMPHETAMINE SACCHARATE, AMPHETAMINE ASPARTATE, DEXTROAMPHETAMINE SULFATE AND AMPHETAMINE SULFATE 5; 5; 5; 5 MG/1; MG/1; MG/1; MG/1
TABLET ORAL
Qty: 30 TABLET | Refills: 0 | Status: SHIPPED | OUTPATIENT
Start: 2023-10-24

## 2023-10-24 NOTE — TELEPHONE ENCOUNTER
Patient called stating he is leaving for a trip, and asked if the medication could please be sent in so he could pick it up at the pharmacy tomorrow.

## 2023-10-24 NOTE — TELEPHONE ENCOUNTER
1 [id number] 09/26/2023 09/26/2023 Amphetamine Salt Combo (Tablet) 30.0 30 20 MG [facility] #[id number] Commercial Insurance 0 / 0 [place]    1 [id number] 09/26/2023 09/26/2023 Amphetamine Salts, Extended Release (Capsule) 30.0 30 ER 30 mg [facility] #[id number] Commercial Insurance 0 / 0 [place]    1 [id number] 08/27/2023 08/25/2023 Amphetamine Salt Combo (Tablet) 30.0 30 20 MG [facility] #[id number] Commercial Insurance 0 / 0 [place]    1 [id number] 08/27/2023 08/25/2023 Amphetamine Salts, Extended Release (Capsule) 30.0 30 ER 30 mg [facility] #[id number] Commercial Insurance 0 / 0 [place]    1 [id number] 07/21/2023 07/21/2023 Amphetamine Salt Combo (Tablet) 30.0 30 20 MG [facility] #[id number] Commercial Insurance 0 / 0 [place]    1 [id number] 07/21/2023 07/21/2023 Amphetamine Salts, Extended Release (Capsule) 30.0 30 ER 30 mg [facility] #[id number] Commercial Insurance 0 / 0 [place]    1 [id number] 06/20/2023 06/20/2023 Mixed Amphetamine Salt (Capsule, Extended Release) 30.0 30 7.5 MG / 7.5 MG / 7.5 MG / 7.5 MG [facility] #[id number] Commercial Insurance 0 / 0 [place]    1 [id number] 06/20/2023 06/20/2023 Amphetamine Salt Combo (Tablet) 30.0 30 20 MG [facility] #[id number] Commercial Insurance 0 / 0 [place]    1 [id number] 05/18/2023 05/16/2023 Mixed Amphetamine Salt (Capsule, Extended Release) 30.0 30 7.5 MG / 7.5 MG / 7.5 MG / 7.5 MG [facility] #[id number] Commercial Insurance 0 / 0 [place]    1 [id number] 05/18/2023 05/16/2023 Amphetamine Salt Combo (Tablet) 30.0 30 20 MG NA RADHA BROADT [facility] #[id number] [place] / 0 [place]

## 2023-11-21 DIAGNOSIS — F90.9 ATTENTION DEFICIT HYPERACTIVITY DISORDER (ADHD), UNSPECIFIED ADHD TYPE: ICD-10-CM

## 2023-11-21 DIAGNOSIS — F98.8 ATTENTION DEFICIT DISORDER, UNSPECIFIED HYPERACTIVITY PRESENCE: ICD-10-CM

## 2023-11-21 RX ORDER — DEXTROAMPHETAMINE SACCHARATE, AMPHETAMINE ASPARTATE, DEXTROAMPHETAMINE SULFATE AND AMPHETAMINE SULFATE 5; 5; 5; 5 MG/1; MG/1; MG/1; MG/1
TABLET ORAL
Qty: 30 TABLET | Refills: 0 | Status: SHIPPED | OUTPATIENT
Start: 2023-11-21

## 2023-11-21 RX ORDER — DEXTROAMPHETAMINE SACCHARATE, AMPHETAMINE ASPARTATE MONOHYDRATE, DEXTROAMPHETAMINE SULFATE AND AMPHETAMINE SULFATE 7.5; 7.5; 7.5; 7.5 MG/1; MG/1; MG/1; MG/1
30 CAPSULE, EXTENDED RELEASE ORAL EVERY MORNING
Qty: 30 CAPSULE | Refills: 0 | Status: SHIPPED | OUTPATIENT
Start: 2023-11-21

## 2023-11-21 NOTE — TELEPHONE ENCOUNTER
1 [id number] 10/24/2023 10/24/2023 Amphetamine Salt Combo (Tablet) 30.0 30 20 MG [facility] #[id number] Commercial Insurance 0 / 0 [address]    1 [id number] 10/24/2023 10/24/2023 Amphetamine Salts, Extended Release (Capsule) 30.0 30 ER 30 mg [facility] #[id number] Commercial Insurance 0 / 0 [address]    1 [id number] 09/26/2023 09/26/2023 Amphetamine Salt Combo (Tablet) 30.0 30 20 MG [facility] #[id number] Commercial Insurance 0 / 0 [address]    1 [id number] 09/26/2023 09/26/2023 Amphetamine Salts, Extended Release (Capsule) 30.0 30 ER 30 mg [facility] #[id number] Commercial Insurance 0 / 0 [address]    1 [id number] 08/27/2023 08/25/2023 Amphetamine Salt Combo (Tablet) 30.0 30 20 MG [facility] #[id number] Commercial Insurance 0 / 0 [address]    1 [id number] 08/27/2023 08/25/2023 Amphetamine Salts, Extended Release (Capsule) 30.0 30 ER 30 mg [facility] #[id number] Commercial Insurance 0 / 0 [address]    1 [id number] 07/21/2023 07/21/2023 Amphetamine Salt Combo (Tablet) 30.0 30 20 MG [facility] #[id number] Commercial Insurance 0 / 0 [address]    1 [id number] 07/21/2023 07/21/2023 Amphetamine Salts, Extended Release (Capsule) 30.0 30 ER 30 mg [facility] #[id number] Commercial Insurance 0 / 0 [address]    1 [id number] 06/20/2023 06/20/2023 Mixed Amphetamine Salt (Capsule, Extended Release) 30.0 30 7.5 MG / 7.5 MG / 7.5 MG / 7.5 MG [facility] #[id number] Commercial Insurance 0 / 0 [address]    1 [id number] 06/20/2023 06/20/2023 Amphetamine Salt Combo (Tablet) 30.0 30 20 MG NA RADHA ESCOBAR [facility] #[id number] Commercial Insurance 0 / 0

## 2023-12-20 ENCOUNTER — TELEPHONE (OUTPATIENT)
Age: 28
End: 2023-12-20

## 2023-12-20 ENCOUNTER — OFFICE VISIT (OUTPATIENT)
Dept: FAMILY MEDICINE CLINIC | Facility: CLINIC | Age: 28
End: 2023-12-20
Payer: COMMERCIAL

## 2023-12-20 VITALS
SYSTOLIC BLOOD PRESSURE: 138 MMHG | TEMPERATURE: 98.1 F | BODY MASS INDEX: 44.1 KG/M2 | OXYGEN SATURATION: 97 % | WEIGHT: 315 LBS | DIASTOLIC BLOOD PRESSURE: 92 MMHG | HEART RATE: 121 BPM | HEIGHT: 71 IN

## 2023-12-20 DIAGNOSIS — J06.9 UPPER RESPIRATORY TRACT INFECTION, UNSPECIFIED TYPE: Primary | ICD-10-CM

## 2023-12-20 DIAGNOSIS — J06.9 UPPER RESPIRATORY TRACT INFECTION, UNSPECIFIED TYPE: ICD-10-CM

## 2023-12-20 DIAGNOSIS — F90.9 ATTENTION DEFICIT HYPERACTIVITY DISORDER (ADHD), UNSPECIFIED ADHD TYPE: ICD-10-CM

## 2023-12-20 DIAGNOSIS — F98.8 ATTENTION DEFICIT DISORDER, UNSPECIFIED HYPERACTIVITY PRESENCE: ICD-10-CM

## 2023-12-20 PROCEDURE — 99213 OFFICE O/P EST LOW 20 MIN: CPT | Performed by: FAMILY MEDICINE

## 2023-12-20 RX ORDER — PREDNISONE 10 MG/1
TABLET ORAL
Qty: 26 TABLET | Refills: 0 | Status: SHIPPED | OUTPATIENT
Start: 2023-12-20

## 2023-12-20 RX ORDER — DEXTROAMPHETAMINE SACCHARATE, AMPHETAMINE ASPARTATE, DEXTROAMPHETAMINE SULFATE AND AMPHETAMINE SULFATE 5; 5; 5; 5 MG/1; MG/1; MG/1; MG/1
TABLET ORAL
Qty: 30 TABLET | Refills: 0 | Status: SHIPPED | OUTPATIENT
Start: 2023-12-20

## 2023-12-20 RX ORDER — DEXTROAMPHETAMINE SACCHARATE, AMPHETAMINE ASPARTATE MONOHYDRATE, DEXTROAMPHETAMINE SULFATE AND AMPHETAMINE SULFATE 7.5; 7.5; 7.5; 7.5 MG/1; MG/1; MG/1; MG/1
30 CAPSULE, EXTENDED RELEASE ORAL EVERY MORNING
Qty: 30 CAPSULE | Refills: 0 | Status: SHIPPED | OUTPATIENT
Start: 2023-12-20 | End: 2023-12-20 | Stop reason: SDUPTHER

## 2023-12-20 RX ORDER — CEFUROXIME AXETIL 500 MG/1
500 TABLET ORAL EVERY 12 HOURS SCHEDULED
Qty: 20 TABLET | Refills: 0 | Status: SHIPPED | OUTPATIENT
Start: 2023-12-20 | End: 2023-12-30

## 2023-12-20 RX ORDER — PREDNISONE 10 MG/1
TABLET ORAL
Qty: 26 TABLET | Refills: 0 | Status: SHIPPED | OUTPATIENT
Start: 2023-12-20 | End: 2023-12-20 | Stop reason: SDUPTHER

## 2023-12-20 RX ORDER — DEXTROAMPHETAMINE SACCHARATE, AMPHETAMINE ASPARTATE MONOHYDRATE, DEXTROAMPHETAMINE SULFATE AND AMPHETAMINE SULFATE 7.5; 7.5; 7.5; 7.5 MG/1; MG/1; MG/1; MG/1
30 CAPSULE, EXTENDED RELEASE ORAL EVERY MORNING
Qty: 30 CAPSULE | Refills: 0 | Status: SHIPPED | OUTPATIENT
Start: 2023-12-20

## 2023-12-20 RX ORDER — CEFUROXIME AXETIL 500 MG/1
500 TABLET ORAL EVERY 12 HOURS SCHEDULED
Qty: 20 TABLET | Refills: 0 | Status: SHIPPED | OUTPATIENT
Start: 2023-12-20 | End: 2023-12-20 | Stop reason: SDUPTHER

## 2023-12-20 NOTE — TELEPHONE ENCOUNTER
Pt called in, he's asking that the refills be sent to the [facility] pharmacy in [place] and he's also requesting the adderall 20 mg tabs be refilled as well.

## 2023-12-21 NOTE — PROGRESS NOTES
Patient ID: Antonio Antunez is a 28 y.o. male.    HPI: 28 y.o.male presenting with 2 week history of sore throat, nasal congestion, ear pain,pnd,wheezing and  cough.  Pt denies any fever, chills, or body aches.  He is negative for covid.      SUBJECTIVE    Family History   Problem Relation Age of Onset    Breast cancer Mother     Diabetes Maternal Grandmother     Heart disease Paternal Grandfather     Hypertension Paternal Grandfather     Cancer Family      Social History     Socioeconomic History    Marital status: Single     Spouse name: Not on file    Number of children: Not on file    Years of education: Not on file    Highest education level: Not on file   Occupational History    Not on file   Tobacco Use    Smoking status: Never     Passive exposure: Past    Smokeless tobacco: Never   Vaping Use    Vaping status: Never Used   Substance and Sexual Activity    Alcohol use: Yes     Comment: social    Drug use: No    Sexual activity: Not on file   Other Topics Concern    Not on file   Social History Narrative    Not on file     Social Determinants of Health     Financial Resource Strain: Not on file   Food Insecurity: Not on file   Transportation Needs: Not on file   Physical Activity: Not on file   Stress: Not on file   Social Connections: Not on file   Intimate Partner Violence: Not on file   Housing Stability: Not on file     Past Medical History:   Diagnosis Date    Herpes simplex type 1 infection     last assessed 04/07/2014    Lymphadenitis, chronic     resolved 04/07/2014    Melanoma ([facility])     back    Viral warts     last assessed 04/07/14     Past Surgical History:   Procedure Laterality Date    MASTECTOMY PARTIAL / LUMPECTOMY W/ AXILLARY LYMPHADENECTOMY      resolved 07/26/2013    UMBILICAL HERNIA REPAIR       No Known Allergies    Current Outpatient Medications:     amphetamine-dextroamphetamine (ADDERALL XR, 30MG,) 30 MG 24 hr capsule, Take 1 capsule (30 mg total) by mouth every morning Max Daily  "Amount: 30 mg, Disp: 30 capsule, Rfl: 0    amphetamine-dextroamphetamine (ADDERALL, 20MG,) 20 mg tablet, Take one tab in afternoon for breakthrough symtpoms, Disp: 30 tablet, Rfl: 0    cefuroxime (CEFTIN) 500 mg tablet, Take 1 tablet (500 mg total) by mouth every 12 (twelve) hours for 10 days, Disp: 20 tablet, Rfl: 0    predniSONE 10 mg tablet, 3 tabs po bid x2 days, then 2 tabs po bid x2 days, then 1 tab bid x2 days, then 1 daily until done., Disp: 26 tablet, Rfl: 0    Review of Systems  Constitutional:     Denies fever, chills ,fatigue ,weakness ,weight loss, weight gain     ENT: Denies loss of hearing ,nosebleed, nasal discharge ,hoarseness; but admits to nasal congestion , sore throat and ear pain.    Pulmonary: Denies shortness of breath ,dyspnea on exertion, orthopnea ; but admits to cough and pnd  Cardiovascular:  Denies bradycardia , tachycardia  ,palpations, lower extremity edema leg, claudication  Breast:  Denies new or changing breast lumps ,nipple discharge ,nipple changes  Abdomen:  Denies abdominal pain , anorexia , indigestion, nausea, vomiting, constipation, diarrhea  Musculoskeletal: Denies myalgias, arthralgias, joint swelling, joint stiffness , limb pain, limb swelling  Lymph: + swollen glands  Gu: Denies polyuria or dysuria  Skin: Denies skin rash, skin lesion, skin wound, itching, dry skin  Neuro: Denies headache, numbness, tingling, confusion, loss of consciousness, dizziness, vertigo  Psychiatric: Denies feelings of depression, suicidal ideation, anxiety, sleep disturbances    OBJECTIVE  /92   Pulse (!) 121   Temp 98.1 °F (36.7 °C)   Ht 5' 11\" (1.803 m)   Wt (!) 192 kg (423 lb)   SpO2 97%   BMI 59.00 kg/m²   Constitutional:   NAD, well appearing and well nourished     ENT:   Conjunctiva and lids: no injection, edema, or discharge    Pupils and iris: RUI bilaterally  External inspection of ears and nose: normal without deformities or discharge.     Otoscopic exam: Canals patent " without erythema, tm dull and erythematous, effusions bilaterally   Nasal mucosa, septum and turbinates: + turbinate injection, nasal discharge         Oropharynx:  Moist mucosa, normal tongue and tonsils without lesions.+ erythema and injection of posterior pharynx with pnd    Pulmonary:Respiratory effort normal rate and rhythm, no increased work of breathing. Auscultation of lungs:  Clear bilaterally with no adventitious breath sounds     Cardiovascular: regular rate and rhythm, S1 and S2, no murmur, no edema and/or varicosities of LE     Abdomen: Soft and nontender with + bowel sounds  No heptomegaly or splenomegaly     Gu: no suprapubic tenderness or CVA tenderness  Lymphatic: + anterior  cervical lymphadenopathy      Musculoskeletal:  Gait and station: Normal gait      Digits and nails normal without clubbing or cyanosis      Inspection/palpation of joints, bones, and muscles:  No joint tenderness, swelling, full active and passive range of motion       Skin: Normal skin turgor and no rashes      Neuro:    Normal reflexes  Pych:   alert and oriented to person, place and time     normal mood and affect      Assessment/Plan:Diagnoses and all orders for this visit:    Upper respiratory tract infection, unspecified type  Comments:  Continue mucinex tx.  Orders:  -     Discontinue: cefuroxime (CEFTIN) 500 mg tablet; Take 1 tablet (500 mg total) by mouth every 12 (twelve) hours for 10 days  -     Discontinue: predniSONE 10 mg tablet; 3 tabs po bid x2 days, then 2 tabs po bid x2 days, then 1 tab bid x2 days, then 1 daily until done.    Attention deficit hyperactivity disorder (ADHD), unspecified ADHD type  Comments:  Adderall was renewed and pt is stable on current therapy  Orders:  -     Discontinue: amphetamine-dextroamphetamine (ADDERALL XR, 30MG,) 30 MG 24 hr capsule; Take 1 capsule (30 mg total) by mouth every morning Max Daily Amount: 30 mg        Reviewed with patient plan to treat with above plan.    Patient  instructed to call in 72 hours if not feeling better or if symptoms worsen

## 2023-12-26 DIAGNOSIS — J06.9 UPPER RESPIRATORY TRACT INFECTION, UNSPECIFIED TYPE: Primary | ICD-10-CM

## 2023-12-26 RX ORDER — AZITHROMYCIN 250 MG/1
TABLET, FILM COATED ORAL
Qty: 6 TABLET | Refills: 0 | Status: SHIPPED | OUTPATIENT
Start: 2023-12-26 | End: 2023-12-30

## 2023-12-26 RX ORDER — DEXTROMETHORPHAN HYDROBROMIDE AND PROMETHAZINE HYDROCHLORIDE 15; 6.25 MG/5ML; MG/5ML
5 SYRUP ORAL 4 TIMES DAILY PRN
Qty: 180 ML | Refills: 0 | Status: SHIPPED | OUTPATIENT
Start: 2023-12-26

## 2024-01-18 DIAGNOSIS — F90.9 ATTENTION DEFICIT HYPERACTIVITY DISORDER (ADHD), UNSPECIFIED ADHD TYPE: ICD-10-CM

## 2024-01-18 DIAGNOSIS — F98.8 ATTENTION DEFICIT DISORDER, UNSPECIFIED HYPERACTIVITY PRESENCE: ICD-10-CM

## 2024-01-18 RX ORDER — DEXTROAMPHETAMINE SACCHARATE, AMPHETAMINE ASPARTATE, DEXTROAMPHETAMINE SULFATE AND AMPHETAMINE SULFATE 5; 5; 5; 5 MG/1; MG/1; MG/1; MG/1
TABLET ORAL
Qty: 30 TABLET | Refills: 0 | Status: SHIPPED | OUTPATIENT
Start: 2024-01-18

## 2024-01-18 RX ORDER — DEXTROAMPHETAMINE SACCHARATE, AMPHETAMINE ASPARTATE MONOHYDRATE, DEXTROAMPHETAMINE SULFATE AND AMPHETAMINE SULFATE 7.5; 7.5; 7.5; 7.5 MG/1; MG/1; MG/1; MG/1
30 CAPSULE, EXTENDED RELEASE ORAL EVERY MORNING
Qty: 30 CAPSULE | Refills: 0 | Status: SHIPPED | OUTPATIENT
Start: 2024-01-18

## 2024-01-18 NOTE — TELEPHONE ENCOUNTER
1 [id number] 12/20/2023 12/20/2023 Mixed Amphetamine Salt (Capsule, Extended Release) 30.0 30 7.5 MG / 7.5 MG / 7.5 MG / 7.5 MG NA RADHA BROADT [facility] #[id number] [place] / [place]    1 [id number] 12/20/2023 12/20/2023 Amphetamine Salts (Tablet) 30.0 30 20 mg NA RADHA BROADT [facility] #[id number] [place]    1 [id number] 11/21/2023 11/21/2023 Amphetamine Salt Combo (Tablet) 30.0 30 20 MG NA RADHA BROADT [facility] #6[facility] [place]    1 [id number] 11/21/2023 11/21/2023 Amphetamine Salts, Extended Release (Capsule) 30.0 30 ER 30 mg NA RADHA BROADT [facility] #6[facility] [place]    1 [id number] 10/24/2023 10/24/2023 Amphetamine Salt Combo (Tablet) 30.0 30 20 MG NA RADHA BROADT [facility] [place]    1 [id number] 10/24/2023 10/24/2023 Amphetamine Salts, Extended Release (Capsule) 30.0 30 ER 30 mg NA RADHA BROADT [facility] [place]    1 [id number] 09/26/2023 09/26/2023 Amphetamine Salt Combo (Tablet) 30.0 30 20 MG NA RADHA BROADT [facility] [place]    1 [id number] 09/26/2023 09/26/2023 Amphetamine Salts, Extended Release (Capsule) 30.0 30 ER 30 mg NA RADHA BROADT [facility] [place]    1 [id number] 08/27/2023 08/25/2023 Amphetamine Salt Combo (Tablet) 30.0 30 20 MG NA RADHA BROADT [facility] [place]    1 [id number] 08/27/2023 08/25/2023 Amphetamine Salts, Extended Release (Capsule) 30.0 30 ER 30 mg NA RADHA BROADT [facility] Commercial Insurance 0 / 0

## 2024-02-19 ENCOUNTER — TELEPHONE (OUTPATIENT)
Age: 29
End: 2024-02-19

## 2024-02-19 DIAGNOSIS — F90.9 ATTENTION DEFICIT HYPERACTIVITY DISORDER (ADHD), UNSPECIFIED ADHD TYPE: ICD-10-CM

## 2024-02-19 DIAGNOSIS — R63.8 INCREASED BMI: Primary | ICD-10-CM

## 2024-02-19 DIAGNOSIS — F98.8 ATTENTION DEFICIT DISORDER, UNSPECIFIED HYPERACTIVITY PRESENCE: ICD-10-CM

## 2024-02-19 RX ORDER — DEXTROAMPHETAMINE SACCHARATE, AMPHETAMINE ASPARTATE, DEXTROAMPHETAMINE SULFATE AND AMPHETAMINE SULFATE 5; 5; 5; 5 MG/1; MG/1; MG/1; MG/1
TABLET ORAL
Qty: 30 TABLET | Refills: 0 | Status: SHIPPED | OUTPATIENT
Start: 2024-02-19

## 2024-02-19 RX ORDER — DEXTROAMPHETAMINE SACCHARATE, AMPHETAMINE ASPARTATE MONOHYDRATE, DEXTROAMPHETAMINE SULFATE AND AMPHETAMINE SULFATE 7.5; 7.5; 7.5; 7.5 MG/1; MG/1; MG/1; MG/1
30 CAPSULE, EXTENDED RELEASE ORAL EVERY MORNING
Qty: 30 CAPSULE | Refills: 0 | Status: SHIPPED | OUTPATIENT
Start: 2024-02-19

## 2024-02-19 NOTE — TELEPHONE ENCOUNTER
1 [id number] 01/18/2024 01/18/2024 Mixed Amphetamine Salt (Capsule, Extended Release) 30.0 30 7.5 MG / 7.5 MG / 7.5 MG / 7.5 MG NA RADHA BROADT [facility] [place]    1 [id number] 01/18/2024 01/18/2024 Amphetamine Salts (Tablet) 30.0 30 20 mg NA RADHA BROADT [facility] [place]    1 [id number] 12/20/2023 12/20/2023 Mixed Amphetamine Salt (Capsule, Extended Release) 30.0 30 7.5 MG / 7.5 MG / 7.5 MG / 7.5 MG NA RADHA BROADT [facility] [place]    1 [id number] 12/20/2023 12/20/2023 Amphetamine Salts (Tablet) 30.0 30 20 mg NA RADHA BROADT [facility] [place]    1 [id number] 11/21/2023 11/21/2023 Amphetamine Salt Combo (Tablet) 30.0 30 20 MG NA RADHA BROADT [facility] [place]    1 [id number] 11/21/2023 11/21/2023 Amphetamine Salts, Extended Release (Capsule) 30.0 30 ER 30 mg NA RADHA BROADT [facility] [place]    1 [id number] 10/24/2023 10/24/2023 Amphetamine Salt Combo (Tablet) 30.0 30 20 MG NA RADHA BROADT [facility] [place]    1 [id number] 10/24/2023 10/24/2023 Amphetamine Salts, Extended Release (Capsule) 30.0 30 ER 30 mg NA RADHA BROADT [facility] [place]    1 [id number] 09/26/2023 09/26/2023 Amphetamine Salt Combo (Tablet) 30.0 30 20 MG NA RADHA BROADT [facility] [place]    1 [id number] 09/26/2023 09/26/2023 Amphetamine Salts, Extended Release (Capsule) 30.0 30 ER 30 mg NA RADHA BROADT [facility] [place]

## 2024-02-19 NOTE — TELEPHONE ENCOUNTER
PA for Phentermine-Topiramate 3.75-23 mg (Qsymia)    Submitted via    [][employer]   [x]C[employer]-Case ID # [id number]  []Faxed to plan   []Other website   []Phone call Case ID #     Office notes sent, clinical questions answered. Awaiting determination    Turnaround time for your insurance to make a decision on your Prior Authorization can take 7-21 business days.

## 2024-02-20 NOTE — TELEPHONE ENCOUNTER
PA for Phentermine-Topiramate (Qsymia) Approved     Date(s) approved 2- to 5-        Patient advised by [x] Tillstert Message                      [] Phone call       Pharmacy advised by [x]Fax                                     []Phone call    Approval letter scanned into Media Yes

## 2024-03-01 ENCOUNTER — OFFICE VISIT (OUTPATIENT)
Dept: FAMILY MEDICINE CLINIC | Facility: CLINIC | Age: 29
End: 2024-03-01
Payer: COMMERCIAL

## 2024-03-01 VITALS
BODY MASS INDEX: 44.1 KG/M2 | DIASTOLIC BLOOD PRESSURE: 88 MMHG | SYSTOLIC BLOOD PRESSURE: 120 MMHG | HEIGHT: 71 IN | TEMPERATURE: 97.4 F | OXYGEN SATURATION: 99 % | WEIGHT: 315 LBS | HEART RATE: 96 BPM

## 2024-03-01 DIAGNOSIS — R05.9 COUGH, UNSPECIFIED TYPE: Primary | ICD-10-CM

## 2024-03-01 DIAGNOSIS — J30.1 ALLERGIC RHINITIS DUE TO POLLEN, UNSPECIFIED SEASONALITY: ICD-10-CM

## 2024-03-01 PROCEDURE — 99213 OFFICE O/P EST LOW 20 MIN: CPT | Performed by: FAMILY MEDICINE

## 2024-03-01 RX ORDER — MONTELUKAST SODIUM 10 MG/1
10 TABLET ORAL
Qty: 30 TABLET | Refills: 5 | Status: SHIPPED | OUTPATIENT
Start: 2024-03-01

## 2024-03-01 RX ORDER — BENZONATATE 200 MG/1
200 CAPSULE ORAL 3 TIMES DAILY PRN
Qty: 30 CAPSULE | Refills: 1 | Status: SHIPPED | OUTPATIENT
Start: 2024-03-01

## 2024-03-01 RX ORDER — PREDNISONE 10 MG/1
TABLET ORAL
Qty: 26 TABLET | Refills: 0 | Status: SHIPPED | OUTPATIENT
Start: 2024-03-01

## 2024-03-07 NOTE — PROGRESS NOTES
Patient ID: Antonio Antunez is a 28 y.o. male.    HPI: 28 y.o.male presenting with a chief complaint of dry cough, intermittent wheezing.  He notes his wheezing is daily and gets exacerbated with exercise and going out into the cold.      SUBJECTIVE    Family History   Problem Relation Age of Onset    Breast cancer Mother     Diabetes Maternal Grandmother     Heart disease Paternal Grandfather     Hypertension Paternal Grandfather     Cancer Family      Social History     Socioeconomic History    Marital status: Single     Spouse name: Not on file    Number of children: Not on file    Years of education: Not on file    Highest education level: Not on file   Occupational History    Not on file   Tobacco Use    Smoking status: Never     Passive exposure: Past    Smokeless tobacco: Never   Vaping Use    Vaping status: Never Used   Substance and Sexual Activity    Alcohol use: Yes     Comment: social    Drug use: No    Sexual activity: Not on file   Other Topics Concern    Not on file   Social History Narrative    Not on file     Social Determinants of Health     Financial Resource Strain: Not on file   Food Insecurity: Not on file   Transportation Needs: Not on file   Physical Activity: Not on file   Stress: Not on file   Social Connections: Not on file   Intimate Partner Violence: Not on file   Housing Stability: Not on file     Past Medical History:   Diagnosis Date    Herpes simplex type 1 infection     last assessed 04/07/2014    Lymphadenitis, chronic     resolved 04/07/2014    Melanoma (HCC)     back    Viral warts     last assessed 04/07/14     Past Surgical History:   Procedure Laterality Date    MASTECTOMY PARTIAL / LUMPECTOMY W/ AXILLARY LYMPHADENECTOMY      resolved 07/26/2013    UMBILICAL HERNIA REPAIR       No Known Allergies    Current Outpatient Medications:     amphetamine-dextroamphetamine (ADDERALL XR, 30MG,) 30 MG 24 hr capsule, Take 1 capsule (30 mg total) by mouth every morning Max Daily Amount: 30  "mg, Disp: 30 capsule, Rfl: 0    amphetamine-dextroamphetamine (ADDERALL, 20MG,) 20 mg tablet, Take one tab in afternoon for breakthrough symtpoms, Disp: 30 tablet, Rfl: 0    benzonatate (TESSALON) 200 MG capsule, Take 1 capsule (200 mg total) by mouth 3 (three) times a day as needed for cough, Disp: 30 capsule, Rfl: 1    montelukast (SINGULAIR) 10 mg tablet, Take 1 tablet (10 mg total) by mouth daily at bedtime, Disp: 30 tablet, Rfl: 5    predniSONE 10 mg tablet, 3 tabs po bid x2 days, then 2 tabs po bid x2 days, then 1 tab bid x2 days, then 1 daily until done., Disp: 26 tablet, Rfl: 0    Review of Systems  Constitutional:     Denies fever, chills ,fatigue ,weakness ,weight loss, weight gain     ENT: Denies loss of hearing ,nosebleed, nasal discharge ,hoarseness; nasal congestion , sore throat and ear pain.    Pulmonary: Denies shortness of breath ,dyspnea on exertion, orthopnea ; but admits to cough, intermittent wheezing and pnd  Cardiovascular:  Denies bradycardia , tachycardia  ,palpations, lower extremity edema leg, claudication  Breast:  Denies new or changing breast lumps ,nipple discharge ,nipple changes  Abdomen:  Denies abdominal pain , anorexia , indigestion, nausea, vomiting, constipation, diarrhea  Musculoskeletal: Denies myalgias, arthralgias, joint swelling, joint stiffness , limb pain, limb swelling  Lymph: + swollen glands  Gu: Denies polyuria or dysuria  Skin: Denies skin rash, skin lesion, skin wound, itching, dry skin  Neuro: Denies headache, numbness, tingling, confusion, loss of consciousness, dizziness, vertigo  Psychiatric: Denies feelings of depression, suicidal ideation, anxiety, sleep disturbances    OBJECTIVE  /88   Pulse 96   Temp (!) 97.4 °F (36.3 °C)   Ht 5' 11\" (1.803 m)   Wt (!) 204 kg (449 lb)   SpO2 99%   BMI 62.62 kg/m²   Constitutional:   NAD, well appearing and well nourished     ENT:   Conjunctiva and lids: no injection, edema, or discharge    Pupils and iris: RUI " bilaterally  External inspection of ears and nose: normal without deformities or discharge.     Otoscopic exam: Canals patent without erythema, tm intact no effusions  Nasal mucosa, septum and turbinates: + turbinate injection, nasal discharge         Oropharynx:  Moist mucosa, normal tongue and tonsils without lesions.no erythema ,injection of posterior pharynx + pnd  Pulmonary:Respiratory effort normal rate and rhythm, no increased work of breathing. Auscultation of lungs:  Clear bilaterally with scattered expiratory wheezes   Cardiovascular: regular rate and rhythm, S1 and S2, no murmur, no edema and/or varicosities of LE     Abdomen: Soft and nontender with + bowel sounds  No heptomegaly or splenomegaly     Gu: no suprapubic tenderness or CVA tenderness  Lymphatic:  anterior  cervical lymphadenopathy      Musculoskeletal:  Gait and station: Normal gait      Digits and nails normal without clubbing or cyanosis      Inspection/palpation of joints, bones, and muscles:  No joint tenderness, swelling, full active and passive range of motion       Skin: Normal skin turgor and no rashes      Neuro:    Normal reflexes  Pych:   alert and oriented to person, place and time     normal mood and affect      Assessment/Plan:Diagnoses and all orders for this visit:    Cough, unspecified type  -     predniSONE 10 mg tablet; 3 tabs po bid x2 days, then 2 tabs po bid x2 days, then 1 tab bid x2 days, then 1 daily until done.  -     benzonatate (TESSALON) 200 MG capsule; Take 1 capsule (200 mg total) by mouth 3 (three) times a day as needed for cough    Allergic rhinitis due to pollen, unspecified seasonality  -     montelukast (SINGULAIR) 10 mg tablet; Take 1 tablet (10 mg total) by mouth daily at bedtime        Reviewed with patient plan to treat with above plan.    Patient instructed to call in 72 hours if not feeling better or if symptoms worsen okay

## 2024-03-15 DIAGNOSIS — F98.8 ATTENTION DEFICIT DISORDER, UNSPECIFIED HYPERACTIVITY PRESENCE: ICD-10-CM

## 2024-03-15 DIAGNOSIS — F90.9 ATTENTION DEFICIT HYPERACTIVITY DISORDER (ADHD), UNSPECIFIED ADHD TYPE: ICD-10-CM

## 2024-03-15 RX ORDER — DEXTROAMPHETAMINE SACCHARATE, AMPHETAMINE ASPARTATE MONOHYDRATE, DEXTROAMPHETAMINE SULFATE AND AMPHETAMINE SULFATE 7.5; 7.5; 7.5; 7.5 MG/1; MG/1; MG/1; MG/1
30 CAPSULE, EXTENDED RELEASE ORAL EVERY MORNING
Qty: 30 CAPSULE | Refills: 0 | Status: SHIPPED | OUTPATIENT
Start: 2024-03-15

## 2024-03-15 RX ORDER — DEXTROAMPHETAMINE SACCHARATE, AMPHETAMINE ASPARTATE, DEXTROAMPHETAMINE SULFATE AND AMPHETAMINE SULFATE 5; 5; 5; 5 MG/1; MG/1; MG/1; MG/1
TABLET ORAL
Qty: 30 TABLET | Refills: 0 | Status: SHIPPED | OUTPATIENT
Start: 2024-03-15

## 2024-03-15 NOTE — TELEPHONE ENCOUNTER
Per patient's phone call, he is [place] for [place] tomorrow afternoon and would like to have the Rx sent to the pharmacy ASAP.

## 2024-03-15 NOTE — TELEPHONE ENCOUNTER
1 [id number] 02/25/2024 02/19/2024 Qsymia (Capsule, Extended Release) 14.0 14 3.75 MG / 23 MG NA RADHA BROADT [employer] Private Pay 0 / 0 [place]    1 [id number] 02/25/2024 02/19/2024 Qsymia (Capsule, Extended Release) 30.0 30 7.5 MG / 46 MG NA RADHA BROADT [employer] Private Pay 0 / 0 [place]    1 [id number] 02/19/2024 02/19/2024 Mixed Amphetamine Salt (Capsule, Extended Release) 30.0 30 7.5 MG / 7.5 MG / 7.5 MG / 7.5 MG NA RADHA BROADT [id number] Commercial Insurance 0 / 0 [place]    1 [id number] 02/19/2024 02/19/2024 Amphetamine Salts (Tablet) 30.0 30 20 mg [id number] Insurance 0 / 0 [place]    1 [id number] 01/18/2024 01/18/2024 Mixed Amphetamine Salt (Capsule, Extended Release) 30.0 30 7.5 MG / 7.5 MG / 7.5 MG / 7.5 MG [id number] Commercial Insurance 0 / 0 [place]    1 [id number] 01/18/2024 01/18/2024 Amphetamine Salts (Tablet) 30.0 30 20 mg NA RADHA BROADT [id number] Commercial Insurance 0 / 0 [place]    1 [id number] 12/20/2023 12/20/2023 Mixed Amphetamine Salt (Capsule, Extended Release) 30.0 30 7.5 MG / 7.5 MG / 7.5 MG / 7.5 MG [id number] Commercial Insurance 0 / 0 [place]    1 [id number] 12/20/2023 12/20/2023 Amphetamine Salts (Tablet) 30.0 30 20 mg [id number] Commercial Insurance 0 / 0 [place]    1 [id number] 11/21/2023 11/21/2023 Amphetamine Salt Combo (Tablet) 30.0 30 20 MG NA RADHA BROADT [id number] Commercial Insurance 0 / 0 [place]    1 [id number] 11/21/2023 11/21/2023 Amphetamine Salts, Extended Release (Capsule) 30.0 30 ER 30 mg NA RADHA ESCOBAR [id number] Commercial Insurance 0 / 0

## 2024-03-15 NOTE — TELEPHONE ENCOUNTER
1 [id number] 02/25/2024 02/19/2024 Qsymia (Capsule, Extended Release) 14.0 14 3.75 MG / 23 MG NA RADHA BROADT [employer] Private Pay 0 / 0 [place]    1 [id number] 02/25/2024 02/19/2024 Qsymia (Capsule, Extended Release) 30.0 30 7.5 MG / 46 MG NA RADHA BROADT [employer] Private Pay 0 / 0 [place]    1 [id number] 02/19/2024 02/19/2024 Mixed Amphetamine Salt (Capsule, Extended Release) 30.0 30 7.5 MG / 7.5 MG / 7.5 MG / 7.5 MG NA RADHA BROADT [id number] Commercial Insurance 0 / 0 [place]    1 [id number] 02/19/2024 02/19/2024 Amphetamine Salts (Tablet) 30.0 30 20 mg [id number] Commercial Insurance 0 / 0 [place]    1 [id number] 01/18/2024 01/18/2024 Mixed Amphetamine Salt (Capsule, Extended Release) 30.0 30 7.5 MG / 7.5 MG / 7.5 MG / 7.5 MG NA [id number] Commercial Insurance 0 / 0 [place]    1 [id number] 01/18/2024 01/18/2024 Amphetamine Salts (Tablet) 30.0 30 20 mg NA RADHA BROADT [id number] Commercial Insurance 0 / 0 [place]    1 [id number] 12/20/2023 12/20/2023 Mixed Amphetamine Salt (Capsule, Extended Release) 30.0 30 7.5 MG / 7.5 MG / 7.5 MG / 7.5 MG NA [id number] Commercial Insurance 0 / 0 [place]    1 [id number] 12/20/2023 12/20/2023 Amphetamine Salts (Tablet) 30.0 30 20 mg NA [id number] Commercial Insurance 0 / 0 [place]    1 [id number] 11/21/2023 11/21/2023 Amphetamine Salt Combo (Tablet) 30.0 30 20 MG NA RADHA BROADT [id number] Commercial Insurance 0 / 0 [place]    1 [id number] 11/21/2023 11/21/2023 Amphetamine Salts, Extended Release (Capsule) 30.0 30 ER 30 mg NA RADHA ESCOBAR [id number] Commercial Insurance 0 / 0

## 2024-03-15 NOTE — TELEPHONE ENCOUNTER
Refill must be reviewed and completed by the office or provider. The refill is unable to be approved by the medication management team.    Cannot be delegated

## 2024-04-05 ENCOUNTER — OFFICE VISIT (OUTPATIENT)
Dept: FAMILY MEDICINE CLINIC | Facility: CLINIC | Age: 29
End: 2024-04-05
Payer: COMMERCIAL

## 2024-04-05 DIAGNOSIS — F41.9 ANXIETY: ICD-10-CM

## 2024-04-05 DIAGNOSIS — F41.9 ANXIETY AND DEPRESSION: Primary | ICD-10-CM

## 2024-04-05 DIAGNOSIS — F32.A ANXIETY AND DEPRESSION: Primary | ICD-10-CM

## 2024-04-05 PROCEDURE — 99213 OFFICE O/P EST LOW 20 MIN: CPT | Performed by: FAMILY MEDICINE

## 2024-04-05 RX ORDER — ESCITALOPRAM OXALATE 10 MG/1
10 TABLET ORAL DAILY
Qty: 30 TABLET | Refills: 5 | Status: SHIPPED | OUTPATIENT
Start: 2024-04-05 | End: 2024-10-02

## 2024-04-05 RX ORDER — ALPRAZOLAM 0.25 MG/1
0.25 TABLET ORAL 3 TIMES DAILY PRN
Qty: 60 TABLET | Refills: 0 | Status: SHIPPED | OUTPATIENT
Start: 2024-04-05

## 2024-04-10 NOTE — PROGRESS NOTES
Patient ID: Antonio Antunez is a 28 y.o. male.    HPI: 28 y.o.male presents for evaluation of anxiety and depression.  He is feeling extremely anxious, experiencing mood swings, intermittent panic attacks.  He denies any dysphoria, and atonia or suicidality.    SUBJECTIVE    Family History   Problem Relation Age of Onset    Breast cancer Mother     Diabetes Maternal Grandmother     Heart disease Paternal Grandfather     Hypertension Paternal Grandfather     Cancer Family      Social History     Socioeconomic History    Marital status: Single     Spouse name: Not on file    Number of children: Not on file    Years of education: Not on file    Highest education level: Not on file   Occupational History    Not on file   Tobacco Use    Smoking status: Never     Passive exposure: Past    Smokeless tobacco: Never   Vaping Use    Vaping status: Never Used   Substance and Sexual Activity    Alcohol use: Yes     Comment: social    Drug use: No    Sexual activity: Not on file   Other Topics Concern    Not on file   Social History Narrative    Not on file     Social Determinants of Health     Financial Resource Strain: Not on file   Food Insecurity: Not on file   Transportation Needs: Not on file   Physical Activity: Not on file   Stress: Not on file   Social Connections: Not on file   Intimate Partner Violence: Not on file   Housing Stability: Not on file     Past Medical History:   Diagnosis Date    Herpes simplex type 1 infection     last assessed 04/07/2014    Lymphadenitis, chronic     resolved 04/07/2014    Melanoma (HCC)     back    Viral warts     last assessed 04/07/14     Past Surgical History:   Procedure Laterality Date    MASTECTOMY PARTIAL / LUMPECTOMY W/ AXILLARY LYMPHADENECTOMY      resolved 07/26/2013    UMBILICAL HERNIA REPAIR       No Known Allergies    Current Outpatient Medications:     ALPRAZolam (XANAX) 0.25 mg tablet, Take 1 tablet (0.25 mg total) by mouth 3 (three) times a day as needed for anxiety,  Disp: 60 tablet, Rfl: 0    escitalopram (LEXAPRO) 10 mg tablet, Take 1 tablet (10 mg total) by mouth daily, Disp: 30 tablet, Rfl: 5    amphetamine-dextroamphetamine (ADDERALL XR, 30MG,) 30 MG 24 hr capsule, Take 1 capsule (30 mg total) by mouth every morning Max Daily Amount: 30 mg, Disp: 30 capsule, Rfl: 0    amphetamine-dextroamphetamine (ADDERALL, 20MG,) 20 mg tablet, Take one tab in afternoon for breakthrough symtpoms, Disp: 30 tablet, Rfl: 0    benzonatate (TESSALON) 200 MG capsule, Take 1 capsule (200 mg total) by mouth 3 (three) times a day as needed for cough, Disp: 30 capsule, Rfl: 1    montelukast (SINGULAIR) 10 mg tablet, Take 1 tablet (10 mg total) by mouth daily at bedtime, Disp: 30 tablet, Rfl: 5    predniSONE 10 mg tablet, 3 tabs po bid x2 days, then 2 tabs po bid x2 days, then 1 tab bid x2 days, then 1 daily until done., Disp: 26 tablet, Rfl: 0    Review of Systems  Constitutional:     Denies fever, chills ,fatigue ,weakness ,weight loss, weight gain     ENT: Denies earache ,loss of hearing ,nosebleed, nasal discharge,nasal congestion ,sore throat ,hoarseness  Pulmonary: Denies shortness of breath ,cough  ,dyspnea on exertion, orthopnea  ,PND   Cardiovascular:  Denies bradycardia , tachycardia  ,palpations, lower extremity edema leg, claudication  Breast:  Denies new or changing breast lumps ,nipple discharge ,nipple changes  Abdomen:  Denies abdominal pain , anorexia , indigestion, nausea, vomiting, constipation, diarrhea  Musculoskeletal: Denies myalgias, arthralgias, joint swelling, joint stiffness , limb pain, limb swelling  Gu: denies dysuria, polyuria  Skin: Denies skin rash, skin lesion, skin wound, itching, dry skin  Neuro: Denies headache, numbness, tingling, confusion, loss of consciousness, dizziness, vertigo  Psychiatric: Denies feelings of depression, suicidal ideation,+ mood swings,+ anxiety, no sleep disturbances    OBJECTIVE  There were no vitals taken for this visit.  Constitutional:    NAD, well appearing and well nourished      ENT:   Conjunctiva and lids: no injection, edema, or discharge     Pupils and iris: RUI bilaterally    External inspection of ears and nose: normal without deformities or discharge.      Otoscopic exam: Canals patent without erythema.       Nasal mucosa, septum and turbinates: Normal or edema or discharge         Oropharynx:  Moist mucosa, normal tongue and tonsils without lesions. No erythema        Pulmonary:Respiratory effort normal rate and rhythm, no increased work of breathing. Auscultation of lungs:  Clear bilaterally with no adventitious breath sounds       Cardiovascular: regular rate and rhythm, S1 and S2, no murmur, no edema and/or varicosities of LE      Abdomen: Soft and non-distended     Positive bowel sounds      No heptomegaly or splenomegaly      Gu: no suprapubic tenderness or CVA tenderness, no urethral discharge  Lymphatic:  No anterior or posterior cervical lymphadenopathy         Musculoskeletal:  Gait and station: Normal gait      Digits and nails normal without clubbing or cyanosis       Inspection/palpation of joints, bones, and muscles:  No joint tenderness, swelling, full active and passive range of motion       Skin: Normal skin turgor and no rashes      Neuro:    Normal reflexes     Psych:   alert and oriented to person, place and time     normal mood and affect       Assessment/Plan:Diagnoses and all orders for this visit:    Anxiety and depression  Comments:  I will recheck the patient in 1 month's time and titrate accordingly pending results.  Orders:  -     escitalopram (LEXAPRO) 10 mg tablet; Take 1 tablet (10 mg total) by mouth daily    Anxiety  -     ALPRAZolam (XANAX) 0.25 mg tablet; Take 1 tablet (0.25 mg total) by mouth 3 (three) times a day as needed for anxiety        Reviewed with patient plan to treat with above plan.  Patient instructed to call in 72 hours if not feeling better or if symptoms worsen

## 2024-04-14 DIAGNOSIS — F90.9 ATTENTION DEFICIT HYPERACTIVITY DISORDER (ADHD), UNSPECIFIED ADHD TYPE: ICD-10-CM

## 2024-04-14 DIAGNOSIS — F98.8 ATTENTION DEFICIT DISORDER, UNSPECIFIED HYPERACTIVITY PRESENCE: ICD-10-CM

## 2024-04-15 RX ORDER — DEXTROAMPHETAMINE SACCHARATE, AMPHETAMINE ASPARTATE MONOHYDRATE, DEXTROAMPHETAMINE SULFATE AND AMPHETAMINE SULFATE 7.5; 7.5; 7.5; 7.5 MG/1; MG/1; MG/1; MG/1
30 CAPSULE, EXTENDED RELEASE ORAL EVERY MORNING
Qty: 30 CAPSULE | Refills: 0 | Status: SHIPPED | OUTPATIENT
Start: 2024-04-15

## 2024-04-15 RX ORDER — DEXTROAMPHETAMINE SACCHARATE, AMPHETAMINE ASPARTATE, DEXTROAMPHETAMINE SULFATE AND AMPHETAMINE SULFATE 5; 5; 5; 5 MG/1; MG/1; MG/1; MG/1
TABLET ORAL
Qty: 30 TABLET | Refills: 0 | Status: SHIPPED | OUTPATIENT
Start: 2024-04-15

## 2024-04-15 NOTE — TELEPHONE ENCOUNTER
1 [id number] 04/05/2024 04/05/2024 ALPRAZolam (Tablet) 60.0 20 0.25 MG NA RADHA BROADT [id number] Commercial Insurance 0 / 0 [place]    1 [id number] 03/18/2024 03/15/2024 Amphetamine Salts (Tablet) 30.0 30 20 mg NA RADHA BROADT [id number] Commercial Insurance 0 / 0 [place]    1 [id number] 03/18/2024 03/15/2024 Amphetamine Salts, Extended Release (Capsule, Extended Release) 30.0 30 ER 30 mg NA RADHA ESCOBAR [id number] Commercial Insurance 0 / 0 [place]    1 [id number] 02/25/2024 02/19/2024 Qsymia (Capsule, Extended Release) 14.0 14 3.75 MG / 23 MG NA RADHA BROADMARIANA [employer] Private Pay 0 / 0 [place]    1 [id number] 02/25/2024 02/19/2024 Qsymia (Capsule, Extended Release) 30.0 30 7.5 MG / 46 MG NA RADHA SHAWN [employer] Private Pay 0 / 0 [place]    1 [id number] 02/19/2024 02/19/2024 Mixed Amphetamine Salt (Capsule, Extended Release) 30.0 30 7.5 MG / 7.5 MG / 7.5 MG / 7.5 MG NA RADHA ESCOBAR [id number] Commercial Insurance 0 / 0 [place]    1 [id number] 02/19/2024 02/19/2024 Amphetamine Salts (Tablet) 30.0 30 20 mg NA RADHA ESCOBAR [id number] Commercial Insurance 0 / 0 [place]    1 [id number] 01/18/2024 01/18/2024 Mixed Amphetamine Salt (Capsule, Extended Release) 30.0 30 7.5 MG / 7.5 MG / 7.5 MG / 7.5 MG NA RADHAJHONY ESCOBAR [id number] Commercial Insurance 0 / 0 [place]    1 [id number] 01/18/2024 01/18/2024 Amphetamine Salts (Tablet) 30.0 30 20 mg NA RADHA BROADT [id number] Commercial Insurance 0 / 0 [place]    1 [id number] 12/20/2023 12/20/2023 Mixed Amphetamine Salt (Capsule, Extended Release) 30.0 30 7.5 MG / 7.5 MG / 7.5 MG / 7.5 MG NA RADHA ESCOBAR [id number] Commercial Insurance 0 / 0

## 2024-05-07 ENCOUNTER — OFFICE VISIT (OUTPATIENT)
Dept: FAMILY MEDICINE CLINIC | Facility: CLINIC | Age: 29
End: 2024-05-07
Payer: COMMERCIAL

## 2024-05-07 VITALS
BODY MASS INDEX: 44.1 KG/M2 | HEIGHT: 71 IN | TEMPERATURE: 97.2 F | HEART RATE: 84 BPM | OXYGEN SATURATION: 98 % | DIASTOLIC BLOOD PRESSURE: 80 MMHG | WEIGHT: 315 LBS | SYSTOLIC BLOOD PRESSURE: 130 MMHG

## 2024-05-07 DIAGNOSIS — F41.9 ANXIETY AND DEPRESSION: Primary | ICD-10-CM

## 2024-05-07 DIAGNOSIS — F32.A ANXIETY AND DEPRESSION: Primary | ICD-10-CM

## 2024-05-07 DIAGNOSIS — F98.8 ATTENTION DEFICIT DISORDER, UNSPECIFIED HYPERACTIVITY PRESENCE: ICD-10-CM

## 2024-05-07 PROCEDURE — 99214 OFFICE O/P EST MOD 30 MIN: CPT | Performed by: FAMILY MEDICINE

## 2024-05-07 RX ORDER — ESCITALOPRAM OXALATE 10 MG/1
10 TABLET ORAL DAILY
Qty: 30 TABLET | Refills: 5 | Status: SHIPPED | OUTPATIENT
Start: 2024-05-07 | End: 2024-11-03

## 2024-05-07 RX ORDER — DEXTROAMPHETAMINE SACCHARATE, AMPHETAMINE ASPARTATE, DEXTROAMPHETAMINE SULFATE AND AMPHETAMINE SULFATE 7.5; 7.5; 7.5; 7.5 MG/1; MG/1; MG/1; MG/1
30 TABLET ORAL 2 TIMES DAILY
Qty: 60 TABLET | Refills: 0 | Status: SHIPPED | OUTPATIENT
Start: 2024-05-07

## 2024-05-09 NOTE — PROGRESS NOTES
Patient ID: Antonio Antunez is a 28 y.o. male.    HPI: 28 y.o.male presents for follow up of anxiety.  He feels the lexapro is working well  and is not having any breakthrough anxiety symptoms.  He does not feel like the xr formula of adderall xr works as well as the short acting.      SUBJECTIVE    Family History   Problem Relation Age of Onset    Breast cancer Mother     Diabetes Maternal Grandmother     Heart disease Paternal Grandfather     Hypertension Paternal Grandfather     Cancer Family      Social History     Socioeconomic History    Marital status: Single     Spouse name: Not on file    Number of children: Not on file    Years of education: Not on file    Highest education level: Not on file   Occupational History    Not on file   Tobacco Use    Smoking status: Never     Passive exposure: Past    Smokeless tobacco: Never   Vaping Use    Vaping status: Never Used   Substance and Sexual Activity    Alcohol use: Yes     Comment: social    Drug use: No    Sexual activity: Not on file   Other Topics Concern    Not on file   Social History Narrative    Not on file     Social Determinants of Health     Financial Resource Strain: Not on file   Food Insecurity: Not on file   Transportation Needs: Not on file   Physical Activity: Not on file   Stress: Not on file   Social Connections: Not on file   Intimate Partner Violence: Not on file   Housing Stability: Not on file     Past Medical History:   Diagnosis Date    Herpes simplex type 1 infection     last assessed 04/07/2014    Lymphadenitis, chronic     resolved 04/07/2014    Melanoma (HCC)     back    Viral warts     last assessed 04/07/14     Past Surgical History:   Procedure Laterality Date    MASTECTOMY PARTIAL / LUMPECTOMY W/ AXILLARY LYMPHADENECTOMY      resolved 07/26/2013    UMBILICAL HERNIA REPAIR       No Known Allergies    Current Outpatient Medications:     ALPRAZolam (XANAX) 0.25 mg tablet, Take 1 tablet (0.25 mg total) by mouth 3 (three) times a day  "as needed for anxiety, Disp: 60 tablet, Rfl: 0    amphetamine-dextroamphetamine (ADDERALL, 30MG,) 30 MG tablet, Take 1 tablet (30 mg total) by mouth 2 (two) times a day Max Daily Amount: 60 mg, Disp: 60 tablet, Rfl: 0    escitalopram (LEXAPRO) 10 mg tablet, Take 1 tablet (10 mg total) by mouth daily, Disp: 30 tablet, Rfl: 5    montelukast (SINGULAIR) 10 mg tablet, Take 1 tablet (10 mg total) by mouth daily at bedtime, Disp: 30 tablet, Rfl: 5    Review of Systems  Constitutional:     Denies fever, chills ,fatigue ,weakness ,weight loss, weight gain     ENT: Denies earache ,loss of hearing ,nosebleed, nasal discharge,nasal congestion ,sore throat ,hoarseness  Pulmonary: Denies shortness of breath ,cough  ,dyspnea on exertion, orthopnea  ,PND   Cardiovascular:  Denies bradycardia , tachycardia  ,palpations, lower extremity edema leg, claudication  Breast:  Denies new or changing breast lumps ,nipple discharge ,nipple changes  Abdomen:  Denies abdominal pain , anorexia , indigestion, nausea, vomiting, constipation, diarrhea  Musculoskeletal: Denies myalgias, arthralgias, joint swelling, joint stiffness , limb pain, limb swelling  Gu: denies dysuria, polyuria  Skin: Denies skin rash, skin lesion, skin wound, itching, dry skin  Neuro: Denies headache, numbness, tingling, confusion, loss of consciousness, dizziness, vertigo  Psychiatric: Denies feelings of depression, suicidal ideation, anxiety, sleep disturbances+ diffuculty focusing     OBJECTIVE  /80 (BP Location: Left arm, Patient Position: Sitting, Cuff Size: Large)   Pulse 84   Temp (!) 97.2 °F (36.2 °C)   Ht 5' 11\" (1.803 m)   Wt (!) 196 kg (433 lb)   SpO2 98%   BMI 60.39 kg/m²   Constitutional:   NAD, well appearing and well nourished      ENT:   Conjunctiva and lids: no injection, edema, or discharge     Pupils and iris: RUI bilaterally    External inspection of ears and nose: normal without deformities or discharge.      Otoscopic exam: Canals " patent without erythema.       Nasal mucosa, septum and turbinates: Normal or edema or discharge         Oropharynx:  Moist mucosa, normal tongue and tonsils without lesions. No erythema        Pulmonary:Respiratory effort normal rate and rhythm, no increased work of breathing. Auscultation of lungs:  Clear bilaterally with no adventitious breath sounds       Cardiovascular: regular rate and rhythm, S1 and S2, no murmur, no edema and/or varicosities of LE      Abdomen: Soft and non-distended     Positive bowel sounds      No heptomegaly or splenomegaly      Gu: no suprapubic tenderness or CVA tenderness, no urethral discharge  Lymphatic:  No anterior or posterior cervical lymphadenopathy         Musculoskeletal:  Gait and station: Normal gait      Digits and nails normal without clubbing or cyanosis       Inspection/palpation of joints, bones, and muscles:  No joint tenderness, swelling, full active and passive range of motion       Skin: Normal skin turgor and no rashes      Neuro:      Normal reflexes     Psych:   alert and oriented to person, place and time     normal mood and affect       Assessment/Plan:Diagnoses and all orders for this visit:    Anxiety and depression  Comments:  Continue current dose of lexapro therapy.  Orders:  -     escitalopram (LEXAPRO) 10 mg tablet; Take 1 tablet (10 mg total) by mouth daily    Attention deficit disorder, unspecified hyperactivity presence  Comments:  Will try pt on 30 mg of short acting bid.  Orders:  -     amphetamine-dextroamphetamine (ADDERALL, 30MG,) 30 MG tablet; Take 1 tablet (30 mg total) by mouth 2 (two) times a day Max Daily Amount: 60 mg        Reviewed with patient plan to treat with above plan.    Patient instructed to call in 72 hours if not feeling better or if symptoms worsen

## 2024-06-05 DIAGNOSIS — F98.8 ATTENTION DEFICIT DISORDER, UNSPECIFIED HYPERACTIVITY PRESENCE: ICD-10-CM

## 2024-06-06 NOTE — TELEPHONE ENCOUNTER
1 [id number] 05/07/2024 05/07/2024 Dextroamphetamine/amphetamine (Tablet) 60.0 30 30 MG NA RADHA BROADT [id number] Commercial Insurance 0 / 0 [place]    1 [id number] 04/15/2024 04/15/2024 Amphetamine Salts (Tablet) 30.0 30 20 mg NA RADHA BROADT [id number] Commercial Insurance 0 / 0 [place]    1 [id number] 04/15/2024 04/15/2024 Amphetamine Salts, Extended Release (Capsule, Extended Release) 30.0 30 ER 30 mg NA RADHA BROADT [id number] Commercial Insurance 0 / 0

## 2024-06-07 RX ORDER — DEXTROAMPHETAMINE SACCHARATE, AMPHETAMINE ASPARTATE, DEXTROAMPHETAMINE SULFATE AND AMPHETAMINE SULFATE 7.5; 7.5; 7.5; 7.5 MG/1; MG/1; MG/1; MG/1
30 TABLET ORAL 2 TIMES DAILY
Qty: 60 TABLET | Refills: 0 | Status: SHIPPED | OUTPATIENT
Start: 2024-06-07

## 2024-07-05 DIAGNOSIS — F98.8 ATTENTION DEFICIT DISORDER, UNSPECIFIED HYPERACTIVITY PRESENCE: ICD-10-CM

## 2024-07-08 RX ORDER — DEXTROAMPHETAMINE SACCHARATE, AMPHETAMINE ASPARTATE, DEXTROAMPHETAMINE SULFATE AND AMPHETAMINE SULFATE 7.5; 7.5; 7.5; 7.5 MG/1; MG/1; MG/1; MG/1
30 TABLET ORAL 2 TIMES DAILY
Qty: 60 TABLET | Refills: 0 | Status: SHIPPED | OUTPATIENT
Start: 2024-07-08

## 2024-07-08 NOTE — TELEPHONE ENCOUNTER
1 [id number] 06/07/2024 06/07/2024 Dextroamphetamine/amphetamine (Tablet) 60.0 30 30 MG NA DILSHAD ARENAS POGODZINSKI [facility] Commercial Insurance 0 / 0 [place]    1 [id number] 05/07/2024 05/07/2024 Dextroamphetamine/amphetamine (Tablet) 60.0 30 30 MG NA RADHA ESCOBAR [facility] Commercial Insurance 0 / 0 [place]    1 [id number] 04/15/2024 04/15/2024 Mixed Amphetamine Salts (Tablet) 30.0 30 20 MG NA RADHAJHONY ESCOBAR [facility] Commercial Insurance 0 / 0 [place]    1 [id number] 04/15/2024 04/15/2024 Mixed Amphetamine Salts (Capsule, Extended Release) 30.0 30 30 MG NA RADHA ESCOBAR [facility] Commercial Insurance 0 / 0 [place]    1 [id number] 04/05/2024 04/05/2024 ALPRAZolam (Tablet) 60.0 20 0.25 MG CAITLIN ESCOBAR [facility] Commercial Insurance 0 / 0 [place]    1 [id number] 03/18/2024 03/15/2024 Mixed Amphetamine Salts (Tablet) 30.0 30 20 MG NA RADHA ESCOBAR [facility] Commercial Insurance 0 / 0 [place]    1 [id number] 03/18/2024 03/15/2024 Mixed Amphetamine Salts (Capsule, Extended Release) 30.0 30 30 MG CAITLIN ESCOBAR [facility] Commercial Insurance 0 / 0 [place]    1 [id number] 02/25/2024 02/19/2024 Qsymia (Capsule, Extended Release) 14.0 14 3.75 MG-23 MG NA RADHA BROADT [employer] Private Pay 0 / 0 [place]    1 [id number] 02/25/2024 02/19/2024 Qsymia (Capsule, Extended Release) 30.0 30 7.5 MG-46 MG NA RADHA BROADT [employer] Private Pay 0 / 0 [place]    1 [id number] 02/19/2024 02/19/2024 Mixed Amphetamine Salt (Capsule, Extended Release) 30.0 30 30 MG NA RADHA SHAWN [facility] Commercial Insurance 0 / 0

## 2024-08-01 DIAGNOSIS — F98.8 ATTENTION DEFICIT DISORDER, UNSPECIFIED HYPERACTIVITY PRESENCE: ICD-10-CM

## 2024-08-01 RX ORDER — DEXTROAMPHETAMINE SACCHARATE, AMPHETAMINE ASPARTATE, DEXTROAMPHETAMINE SULFATE AND AMPHETAMINE SULFATE 7.5; 7.5; 7.5; 7.5 MG/1; MG/1; MG/1; MG/1
30 TABLET ORAL 2 TIMES DAILY
Qty: 60 TABLET | Refills: 0 | Status: SHIPPED | OUTPATIENT
Start: 2024-08-01

## 2024-08-01 NOTE — TELEPHONE ENCOUNTER
1 [id number] 07/08/2024 07/08/2024 Dextroamphetamine/amphetamine (Tablet) 60.0 30 30 MG NA CECIL BENNETT [facility] Commercial Insurance 0 / 0 [place]    1 [id number] 06/07/2024 06/07/2024 Dextroamphetamine/amphetamine (Tablet) 60.0 30 30 MG NA DILSHAD ARENAS POGODZINSKI [facility] Commercial Insurance 0 / 0 [place]    1 [id number] 05/07/2024 05/07/2024 Dextroamphetamine/amphetamine (Tablet) 60.0 30 30 MG NA RADHAJHONY ESCOBAR [facility] Commercial Insurance 0 / 0 [place]    1 [id number] 04/15/2024 04/15/2024 Mixed Amphetamine Salts (Tablet) 30.0 30 20 MG NA RADHA SHAWN [facility] Commercial Insurance 0 / 0 [place]    1 [id number] 04/15/2024 04/15/2024 Mixed Amphetamine Salts (Capsule, Extended Release) 30.0 30 30 MG NA RADHA SHAWN [facility] Commercial Insurance 0 / 0 [place]    1 [id number] 04/05/2024 04/05/2024 ALPRAZolam (Tablet) 60.0 20 0.25 MG NA RADHA SHAWN [facility] Commercial Insurance 0 / 0 [place]    1 [id number] 03/18/2024 03/15/2024 Mixed Amphetamine Salts (Tablet) 30.0 30 20 MG NA RADHA SHAWN [facility] Commercial Insurance 0 / 0 [place]    1 [id number] 03/18/2024 03/15/2024 Mixed Amphetamine Salts (Capsule, Extended Release) 30.0 30 30 MG NA RADHA BROADT [facility] Commercial Insurance 0 / 0 [place]    1 [id number] 02/25/2024 02/19/2024 Qsymia (Capsule, Extended Release) 14.0 14 3.75 MG-23 MG NA RADHA BROADT [employer] Private Pay 0 / 0 [place]    1 [id number] 02/25/2024 02/19/2024 Qsymia (Capsule, Extended Release) 30.0 30 7.5 MG-46 MG NA RADHA BROADT [employer] Private Pay 0 / 0 [place]

## 2024-08-29 ENCOUNTER — APPOINTMENT (OUTPATIENT)
Dept: LAB | Facility: CLINIC | Age: 29
End: 2024-08-29
Payer: COMMERCIAL

## 2024-08-29 ENCOUNTER — OFFICE VISIT (OUTPATIENT)
Dept: FAMILY MEDICINE CLINIC | Facility: CLINIC | Age: 29
End: 2024-08-29
Payer: COMMERCIAL

## 2024-08-29 VITALS
TEMPERATURE: 97.1 F | BODY MASS INDEX: 45.1 KG/M2 | OXYGEN SATURATION: 99 % | HEART RATE: 75 BPM | DIASTOLIC BLOOD PRESSURE: 82 MMHG | SYSTOLIC BLOOD PRESSURE: 126 MMHG | WEIGHT: 315 LBS | HEIGHT: 70 IN

## 2024-08-29 DIAGNOSIS — F98.8 ATTENTION DEFICIT DISORDER, UNSPECIFIED HYPERACTIVITY PRESENCE: ICD-10-CM

## 2024-08-29 DIAGNOSIS — Z00.00 ANNUAL PHYSICAL EXAM: Primary | ICD-10-CM

## 2024-08-29 DIAGNOSIS — R74.8 ELEVATED LIVER ENZYMES: ICD-10-CM

## 2024-08-29 LAB
ALT SERPL W P-5'-P-CCNC: 154 U/L (ref 7–52)
AST SERPL W P-5'-P-CCNC: 62 U/L (ref 13–39)

## 2024-08-29 PROCEDURE — 36415 COLL VENOUS BLD VENIPUNCTURE: CPT

## 2024-08-29 PROCEDURE — 84450 TRANSFERASE (AST) (SGOT): CPT

## 2024-08-29 PROCEDURE — 84460 ALANINE AMINO (ALT) (SGPT): CPT

## 2024-08-29 PROCEDURE — 99395 PREV VISIT EST AGE 18-39: CPT | Performed by: FAMILY MEDICINE

## 2024-08-29 RX ORDER — DEXTROAMPHETAMINE SACCHARATE, AMPHETAMINE ASPARTATE, DEXTROAMPHETAMINE SULFATE AND AMPHETAMINE SULFATE 7.5; 7.5; 7.5; 7.5 MG/1; MG/1; MG/1; MG/1
30 TABLET ORAL 2 TIMES DAILY
Qty: 60 TABLET | Refills: 0 | Status: SHIPPED | OUTPATIENT
Start: 2024-08-29

## 2024-08-29 NOTE — PROGRESS NOTES
Adult Annual Physical  Name: Antonio Antunez      : 1995      MRN: [id number]  Encounter Provider: Krys Ashraf DO  Encounter Date: 2024   Encounter department: [facility]    Assessment & Plan   1. Annual physical exam  2. Attention deficit disorder, unspecified hyperactivity presence  Comments:  Will try pt on 30 mg of short acting bid.  Orders:  -     amphetamine-dextroamphetamine (ADDERALL, 30MG,) 30 MG tablet; Take 1 tablet (30 mg total) by mouth 2 (two) times a day Max Daily Amount: 60 mg  3. Elevated liver enzymes  Comments:  Will recheck liver enzymes.  Orders:  -     AST; Future  -     ALT; Future    Immunizations and preventive care screenings were discussed with patient today. Appropriate education was printed on patient's after visit summary.    Counseling:  Exercise: the importance of regular exercise/physical activity was discussed. Recommend exercise 3-5 times per week for at least 30 minutes.          History of Present Illness     Adult Annual Physical:  Patient presents for annual physical.     Diet and Physical Activity:  - Diet/Nutrition: well balanced diet.  - Exercise: moderate cardiovascular exercise.    Depression Screening:  - PHQ-2 Score: 0    General Health:  - Sleep: sleeps well.  - Hearing: normal hearing bilateral ears.  - Vision: goes for regular eye exams.  - Dental: regular dental visits.     Health:  - History of STDs: no.   - Urinary symptoms: none.     Advanced Care Planning:  - Has an advanced directive?: no    - Has a durable medical POA?: no    - ACP document given to patient?: yes      Review of Systems   Constitutional:  Negative for appetite change, chills and fever.   HENT:  Negative for ear pain, facial swelling, rhinorrhea, sinus pain, sore throat and trouble swallowing.    Eyes:  Negative for discharge and redness.   Respiratory:  Negative for chest tightness, shortness of breath and wheezing.    Cardiovascular:   "Negative for chest pain and palpitations.   Gastrointestinal:  Negative for abdominal pain, diarrhea, nausea and vomiting.   Endocrine: Negative for polyuria.   Genitourinary:  Negative for dysuria and urgency.   Musculoskeletal:  Negative for arthralgias and back pain.   Skin:  Negative for rash.   Neurological:  Negative for dizziness, weakness and headaches.   Hematological:  Negative for adenopathy.   Psychiatric/Behavioral:  Negative for behavioral problems, confusion and sleep disturbance.    All other systems reviewed and are negative.    Pertinent Medical History   ADD,anxiety, and allergic rhinitis      Objective     /82   Pulse 75   Temp (!) 97.1 °F (36.2 °C)   Ht 5' 10.32\" (1.786 m)   Wt (!) 198 kg (436 lb 12.8 oz)   SpO2 99%   BMI 62.11 kg/m²     Physical Exam  Vitals and nursing note reviewed.   Constitutional:       General: He is not in acute distress.     Appearance: Normal appearance. He is well-developed. He is not ill-appearing or diaphoretic.   HENT:      Head: Normocephalic and atraumatic.      Right Ear: Tympanic membrane, ear canal and external ear normal.      Left Ear: Tympanic membrane, ear canal and external ear normal.      Nose: Nose normal. No congestion or rhinorrhea.      Mouth/Throat:      Mouth: Mucous membranes are moist.      Pharynx: Oropharynx is clear. No oropharyngeal exudate or posterior oropharyngeal erythema.   Eyes:      General: No scleral icterus.        Right eye: No discharge.         Left eye: No discharge.      Extraocular Movements: Extraocular movements intact.      Conjunctiva/sclera: Conjunctivae normal.      Pupils: Pupils are equal, round, and reactive to light.   Neck:      Thyroid: No thyromegaly.      Vascular: No carotid bruit or JVD.      Trachea: No tracheal deviation.   Cardiovascular:      Rate and Rhythm: Normal rate and regular rhythm.      Pulses: Normal pulses.      Heart sounds: Normal heart sounds. No murmur heard.  Pulmonary:      " Effort: Pulmonary effort is normal. No respiratory distress.      Breath sounds: Normal breath sounds. No stridor. No wheezing, rhonchi or rales.   Abdominal:      General: Abdomen is flat. Bowel sounds are normal. There is no distension.      Palpations: Abdomen is soft. There is no mass.      Tenderness: There is no abdominal tenderness. There is no guarding or rebound.   Musculoskeletal:         General: No swelling, tenderness or deformity. Normal range of motion.      Cervical back: Normal range of motion and neck supple. No rigidity.      Right lower leg: No edema.      Left lower leg: No edema.   Lymphadenopathy:      Cervical: No cervical adenopathy.   Skin:     General: Skin is warm and dry.      Capillary Refill: Capillary refill takes less than 2 seconds.      Coloration: Skin is not jaundiced.      Findings: No bruising, erythema or rash.   Neurological:      General: No focal deficit present.      Mental Status: He is alert and oriented to person, place, and time.      Cranial Nerves: No cranial nerve deficit.      Sensory: No sensory deficit.      Motor: No abnormal muscle tone.      Coordination: Coordination normal.      Gait: Gait normal.      Deep Tendon Reflexes: Reflexes are normal and symmetric. Reflexes normal.   Psychiatric:         Mood and Affect: Mood normal.         Behavior: Behavior normal.         Thought Content: Thought content normal.         Judgment: Judgment normal.       Administrative Statements   I have spent a total time of 20 minutes in caring for this patient on the day of the visit/encounter including Diagnostic results, Prognosis, Risks and benefits of tx options, Instructions for management, Patient and family education, and Importance of tx compliance.

## 2024-08-29 NOTE — PATIENT INSTRUCTIONS
"Patient Education     Routine physical for adults   The Basics   Written by the doctors and editors at [facility]   What is a physical? -- A physical is a routine visit, or \"check-up,\" with your doctor. You might also hear it called a \"wellness visit\" or \"preventive visit.\"  During each visit, the doctor will:   Ask about your physical and mental health   Ask about your habits, behaviors, and lifestyle   Do an exam   Give you vaccines if needed   Talk to you about any medicines you take   Give advice about your health   Answer your questions  Getting regular check-ups is an important part of taking care of your health. It can help your doctor find and treat any problems you have. But it's also important for preventing health problems.  A routine physical is different from a \"sick visit.\" A sick visit is when you see a doctor because of a health concern or problem. Since physicals are scheduled ahead of time, you can think about what you want to ask the doctor.  How often should I get a physical? -- It depends on your age and health. In general, for people age 21 years and older:   If you are younger than 50 years, you might be able to get a physical every 3 years.   If you are 50 years or older, your doctor might recommend a physical every year.  If you have an ongoing health condition, like diabetes or high blood pressure, your doctor will probably want to see you more often.  What happens during a physical? -- In general, each visit will include:   Physical exam - The doctor or nurse will check your height, weight, heart rate, and blood pressure. They will also look at your eyes and ears. They will ask about how you are feeling and whether you have any symptoms that bother you.   Medicines - It's a good idea to bring a list of all the medicines you take to each doctor visit. Your doctor will talk to you about your medicines and answer any questions. Tell them if you are having any side effects that bother you. You " "should also tell them if you are having trouble paying for any of your medicines.   Habits and behaviors - This includes:   Your diet   Your exercise habits   Whether you smoke, drink alcohol, or use drugs   Whether you are sexually active   Whether you feel safe at home  Your doctor will talk to you about things you can do to improve your health and lower your risk of health problems. They will also offer help and support. For example, if you want to quit smoking, they can give you advice and might prescribe medicines. If you want to improve your diet or get more physical activity, they can help you with this, too.   Lab tests, if needed - The tests you get will depend on your age and situation. For example, your doctor might want to check your:   Cholesterol   Blood sugar   Iron level   Vaccines - The recommended vaccines will depend on your age, health, and what vaccines you already had. Vaccines are very important because they can prevent certain serious or deadly infections.   Discussion of screening - \"Screening\" means checking for diseases or other health problems before they cause symptoms. Your doctor can recommend screening based on your age, risk, and preferences. This might include tests to check for:   Cancer, such as breast, prostate, cervical, ovarian, colorectal, prostate, lung, or skin cancer   Sexually transmitted infections, such as chlamydia and gonorrhea   Mental health conditions like depression and anxiety  Your doctor will talk to you about the different types of screening tests. They can help you decide which screenings to have. They can also explain what the results might mean.   Answering questions - The physical is a good time to ask the doctor or nurse questions about your health. If needed, they can refer you to other doctors or specialists, too.  Adults older than 65 years often need other care, too. As you get older, your doctor will talk to you about:   How to prevent falling at " home   Hearing or vision tests   Memory testing   How to take your medicines safely   Making sure that you have the help and support you need at home  All topics are updated as new evidence becomes available and our peer review process is complete.  This topic retrieved from [employer] on: May 02, 2024.  Topic 718501 Version 1.0  Release: 32.4.3 - C32.122  © 2024 UpToDate, Inc. and/or its affiliates. All rights reserved.  Consumer Information Use and Disclaimer   Disclaimer: This generalized information is a limited summary of diagnosis, treatment, and/or medication information. It is not meant to be comprehensive and should be used as a tool to help the user understand and/or assess potential diagnostic and treatment options. It does NOT include all information about conditions, treatments, medications, side effects, or risks that may apply to a specific patient. It is not intended to be medical advice or a substitute for the medical advice, diagnosis, or treatment of a health care provider based on the health care provider's examination and assessment of a patient's specific and unique circumstances. Patients must speak with a health care provider for complete information about their health, medical questions, and treatment options, including any risks or benefits regarding use of medications. This information does not endorse any treatments or medications as safe, effective, or approved for treating a specific patient. UpToDate, Inc. and its affiliates disclaim any warranty or liability relating to this information or the use thereof.The use of this information is governed by the Terms of Use, available at https://www.woltersFlatBurgeruwer.com/en/know/clinical-effectiveness-terms. 2024© UpToDate, Inc. and its affiliates and/or licensors. All rights reserved.  Copyright   © 2024 UpToDate, Inc. and/or its affiliates. All rights reserved.

## 2024-08-30 ENCOUNTER — TELEPHONE (OUTPATIENT)
Dept: FAMILY MEDICINE CLINIC | Facility: CLINIC | Age: 29
End: 2024-08-30

## 2024-08-30 DIAGNOSIS — R74.8 ABNORMAL LIVER ENZYMES: Primary | ICD-10-CM

## 2024-08-30 NOTE — TELEPHONE ENCOUNTER
----- Message from Krys Ashraf DO sent at 8/30/2024  7:54 AM EDT -----  Patient's liver enzymes are abnormal; I would like him to have an ultrasound of the liver.  Orders are already and he can call central scheduling.

## 2024-09-27 DIAGNOSIS — F98.8 ATTENTION DEFICIT DISORDER, UNSPECIFIED HYPERACTIVITY PRESENCE: ICD-10-CM

## 2024-09-27 NOTE — TELEPHONE ENCOUNTER
1 [id number] 08/29/2024 08/29/2024 Dextroamphetamine/amphetamine (Tablet) 60.0 30 30 MG NA RADHA ESCOBAR [facility] Commercial Insurance 0 / 0 [place]    1 [id number] 08/01/2024 08/01/2024 Dextroamphetamine/amphetamine (Tablet) 60.0 30 30 MG NA RADHA ESCOBAR [facility] Commercial Insurance 0 / 0 [place]    1 [id number] 07/08/2024 07/08/2024 Dextroamphetamine/amphetamine (Tablet) 60.0 30 30 MG NA CECIL SABRINA [facility] Commercial Insurance 0 / 0 [place]    1 [id number] 06/07/2024 06/07/2024 Dextroamphetamine/amphetamine (Tablet) 60.0 30 30 MG NA DILSHAD ARENAS POGODZINSKI [facility] Commercial Insurance 0 / 0 [place]    1 [id number] 05/07/2024 05/07/2024 Dextroamphetamine/amphetamine (Tablet) 60.0 30 30 MG NA RADHA ESCOBAR [facility] Commercial Insurance 0 / 0 [place]    1 [id number] 04/15/2024 04/15/2024 Mixed Amphetamine Salts (Tablet) 30.0 30 20 MG NA RADHA ESCOBAR [facility] Commercial Insurance 0 / 0 [place]    1 [id number] 04/15/2024 04/15/2024 Mixed Amphetamine Salts (Capsule, Extended Release) 30.0 30 30 MG NA RADHA ESCOBAR [facility] Commercial Insurance 0 / 0 [place]    1 [id number] 04/05/2024 04/05/2024 ALPRAZolam (Tablet) 60.0 20 0.25 MG CAITLIN ESCOBAR [facility] Commercial Insurance 0 / 0 [place]    1 [id number] 03/18/2024 03/15/2024 Mixed Amphetamine Salts (Tablet) 30.0 30 20 MG NA RADHAJHONY ESCOBAR [facility] Commercial Insurance 0 / 0 [place]    1 [id number] 03/18/2024 03/15/2024 Mixed Amphetamine Salts (Capsule, Extended Release) 30.0 30 30 MG NA RADHAJHONY ESCOBAR [facility] Commercial Insurance 0 / 0 [place]

## 2024-09-30 RX ORDER — DEXTROAMPHETAMINE SACCHARATE, AMPHETAMINE ASPARTATE, DEXTROAMPHETAMINE SULFATE AND AMPHETAMINE SULFATE 7.5; 7.5; 7.5; 7.5 MG/1; MG/1; MG/1; MG/1
30 TABLET ORAL 2 TIMES DAILY
Qty: 60 TABLET | Refills: 0 | Status: SHIPPED | OUTPATIENT
Start: 2024-09-30

## 2024-10-23 DIAGNOSIS — F98.8 ATTENTION DEFICIT DISORDER, UNSPECIFIED HYPERACTIVITY PRESENCE: ICD-10-CM

## 2024-10-23 NOTE — TELEPHONE ENCOUNTER
1 [id number] 09/30/2024 09/30/2024 Mixed Amphetamine Salts (Tablet) 60.0 30 30 MG NA CECIL BENNETT [facility] Commercial Insurance 0 / 0 [place]    1 [id number] 08/29/2024 08/29/2024 Mixed Amphetamine Salts (Tablet) 60.0 30 30 MG NA RADHAJHONY ESCOBAR [facility] Commercial Insurance 0 / 0 [place]    1 [id number] 08/01/2024 08/01/2024 Mixed Amphetamine Salts (Tablet) 60.0 30 30 MG NA RADHAJHONY ESCOBAR [facility] Commercial Insurance 0 / 0 [place]    1 [id number] 07/08/2024 07/08/2024 Mixed Amphetamine Salts (Tablet) 60.0 30 30 MG NA CECIL BENNETT [facility] Commercial Insurance 0 / 0 [place]    1 [id number] 06/07/2024 06/07/2024 Mixed Amphetamine Salts (Tablet) 60.0 30 30 MG NA DILSHAD ARENAS POGODZINSKI [facility] Commercial Insurance 0 / 0 [place]    1 [id number] 05/07/2024 05/07/2024 Mixed Amphetamine Salts (Tablet) 60.0 30 30 MG NA RADHA ESCOBAR [facility] Commercial Insurance 0 / 0 [place]    1 [id number] 04/15/2024 04/15/2024 Mixed Amphetamine Salts (Tablet) 30.0 30 20 MG NA RADHA ESCOBAR [facility] Commercial Insurance 0 / 0 [place]    1 [id number] 04/15/2024 04/15/2024 Mixed Amphetamine Salts (Capsule, Extended Release) 30.0 30 30 MG NA RADHA ESCOBAR [facility] Commercial Insurance 0 / 0 [place]    1 [id number] 04/05/2024 04/05/2024 ALPRAZolam (Tablet) 60.0 20 0.25 MG NA RADHA ESCOBAR [facility] Commercial Insurance 0 / 0 [place]    1 [id number] 03/18/2024 03/15/2024 Mixed Amphetamine Salts (Tablet) 30.0 30 20 MG NA RADHAJHONY ESCOBAR [facility] Commercial Insurance 0 / 0 [place]

## 2024-10-24 RX ORDER — DEXTROAMPHETAMINE SACCHARATE, AMPHETAMINE ASPARTATE, DEXTROAMPHETAMINE SULFATE AND AMPHETAMINE SULFATE 7.5; 7.5; 7.5; 7.5 MG/1; MG/1; MG/1; MG/1
30 TABLET ORAL 2 TIMES DAILY
Qty: 60 TABLET | Refills: 0 | Status: SHIPPED | OUTPATIENT
Start: 2024-10-24

## 2024-12-01 DIAGNOSIS — F98.8 ATTENTION DEFICIT DISORDER, UNSPECIFIED HYPERACTIVITY PRESENCE: ICD-10-CM

## 2024-12-02 RX ORDER — DEXTROAMPHETAMINE SACCHARATE, AMPHETAMINE ASPARTATE, DEXTROAMPHETAMINE SULFATE AND AMPHETAMINE SULFATE 7.5; 7.5; 7.5; 7.5 MG/1; MG/1; MG/1; MG/1
30 TABLET ORAL 2 TIMES DAILY
Qty: 60 TABLET | Refills: 0 | Status: SHIPPED | OUTPATIENT
Start: 2024-12-02

## 2024-12-02 NOTE — TELEPHONE ENCOUNTER
1 [id number] 10/28/2024 10/24/2024 Amphetamine Salt Combo (Tablet) 60.0 30 30 MG NA RADHA ESCOBAR [facility] Commercial Insurance 0 / 0 [place]    1 [id number] 09/30/2024 09/30/2024 Mixed Amphetamine Salts (Tablet) 60.0 30 30 MG NA CECIL BENNETT [facility] Commercial Insurance 0 / 0 [place]    1 [id number] 08/29/2024 08/29/2024 Mixed Amphetamine Salts (Tablet) 60.0 30 30 MG NA RADHA ESCOBAR [facility] Commercial Insurance 0 / 0 [place]    1 [id number] 08/01/2024 08/01/2024 Mixed Amphetamine Salts (Tablet) 60.0 30 30 MG NA RADHA ESCOBAR [facility] Commercial Insurance 0 / 0 [place]    1 [id number] 07/08/2024 07/08/2024 Mixed Amphetamine Salts (Tablet) 60.0 30 30 MG NA CECIL BENNETT [facility] Commercial Insurance 0 / 0 [place]    1 [id number] 06/07/2024 06/07/2024 Mixed Amphetamine Salts (Tablet) 60.0 30 30 MG NA DILSHAD ARENAS POGODZINSKI [facility] Commercial Insurance 0 / 0 PA    1 [id number] 05/07/2024 05/07/2024 Mixed Amphetamine Salts (Tablet) 60.0 30 30 MG NA RADHA ESCOBAR [facility] Commercial Insurance 0 / 0 [place]    1 [id number] 04/15/2024 04/15/2024 Mixed Amphetamine Salts (Tablet) 30.0 30 20 MG CAITLIN ESCOBAR [facility] Commercial Insurance 0 / 0 [place]    1 [id number] 04/15/2024 04/15/2024 Mixed Amphetamine Salts (Capsule, Extended Release) 30.0 30 30 MG NA RADHA ESCOBAR [facility] Commercial Insurance 0 / 0 [place]    1 [id number] 04/05/2024 04/05/2024 ALPRAZolam (Tablet) 60.0 20 0.25 MG NA RADHA ESCOBAR [facility] Commercial Insurance 0 / 0 [place]

## 2024-12-23 DIAGNOSIS — F98.8 ATTENTION DEFICIT DISORDER, UNSPECIFIED HYPERACTIVITY PRESENCE: ICD-10-CM

## 2024-12-23 RX ORDER — DEXTROAMPHETAMINE SACCHARATE, AMPHETAMINE ASPARTATE, DEXTROAMPHETAMINE SULFATE AND AMPHETAMINE SULFATE 7.5; 7.5; 7.5; 7.5 MG/1; MG/1; MG/1; MG/1
30 TABLET ORAL 2 TIMES DAILY
Qty: 60 TABLET | Refills: 0 | Status: SHIPPED | OUTPATIENT
Start: 2024-12-23

## 2024-12-23 NOTE — TELEPHONE ENCOUNTER
1 [id number] 12/02/2024 12/02/2024 Amphetamine Salt Combo (Tablet) 60.0 30 30 MG NA RADHA ESCOBAR [facility] Commercial Insurance 0 / 0 [place]    1 [id number] 10/28/2024 10/24/2024 Amphetamine Salt Combo (Tablet) 60.0 30 30 MG NA RADHA ESCOBAR [facility] Commercial Insurance 0 / 0 [place]    1 [id number] 09/30/2024 09/30/2024 Mixed Amphetamine Salts (Tablet) 60.0 30 30 MG NA CECIL BENNETT [facility] Commercial Insurance 0 / 0 [place]    1 [id number] 08/29/2024 08/29/2024 Mixed Amphetamine Salts (Tablet) 60.0 30 30 MG NA RADHA ESCOBAR [facility] Commercial Insurance 0 / 0 [place]    1 [id number] 08/01/2024 08/01/2024 Mixed Amphetamine Salts (Tablet) 60.0 30 30 MG NA RADHA ESCOBAR [facility] Commercial Insurance 0 / 0 [place]    1 [id number] 07/08/2024 07/08/2024 Mixed Amphetamine Salts (Tablet) 60.0 30 30 MG NA CECIL BENNETT [facility] Commercial Insurance 0 / 0 [place]    1 [id number] 06/07/2024 06/07/2024 Mixed Amphetamine Salts (Tablet) 60.0 30 30 MG NA DILSHAD ARENAS POGODZINSKI [facility] Commercial Insurance 0 / 0 [place]    1 [id number] 05/07/2024 05/07/2024 Mixed Amphetamine Salts (Tablet) 60.0 30 30 MG NA RADHA ESCOBAR [facility] Commercial Insurance 0 / 0 [place]    1 [id number] 04/15/2024 04/15/2024 Mixed Amphetamine Salts (Tablet) 30.0 30 20 MG NA RADHA ESCOBAR [facility] Commercial Insurance 0 / 0 [place]    1 [id number] 04/15/2024 04/15/2024 Mixed Amphetamine Salts (Capsule, Extended Release) 30.0 30 30 MG NA RADHA ESCOBAR [facility] Commercial Insurance 0 / 0 [place]

## 2025-01-29 DIAGNOSIS — F98.8 ATTENTION DEFICIT DISORDER, UNSPECIFIED HYPERACTIVITY PRESENCE: ICD-10-CM

## 2025-01-30 RX ORDER — DEXTROAMPHETAMINE SACCHARATE, AMPHETAMINE ASPARTATE, DEXTROAMPHETAMINE SULFATE AND AMPHETAMINE SULFATE 7.5; 7.5; 7.5; 7.5 MG/1; MG/1; MG/1; MG/1
30 TABLET ORAL 2 TIMES DAILY
Qty: 60 TABLET | Refills: 0 | Status: SHIPPED | OUTPATIENT
Start: 2025-01-30

## 2025-01-30 NOTE — TELEPHONE ENCOUNTER
1 [id number] 12/30/2024 12/23/2024 Amphetamine Salt Combo (Tablet) 60.0 30 30 MG NA CECIL BENNETT [facility] [place] 0 / 0 [place]    1 [id number] 12/02/2024 12/02/2024 Amphetamine Salt Combo (Tablet) 60.0 30 30 MG NA RADHA ESCOBAR [facility] Commercial Insurance 0 / 0 [place]    1 [id number] 10/28/2024 10/24/2024 Amphetamine Salt Combo (Tablet) 60.0 30 30 MG NA RADHA ESCOBAR [facility] Commercial Insurance 0 / 0 [place]    1 [id number] 09/30/2024 09/30/2024 Mixed Amphetamine Salts (Tablet) 60.0 30 30 MG NA CECIL BENNETT [facility] Commercial Insurance 0 / 0 [place]    1 [id number] 08/29/2024 08/29/2024 Mixed Amphetamine Salts (Tablet) 60.0 30 30 MG NA RADHA ESCOBAR [facility] Commercial Insurance 0 / 0 [place]    1 [id number] 08/01/2024 08/01/2024 Mixed Amphetamine Salts (Tablet) 60.0 30 30 MG NA RADHA ESCOBAR [facility] Commercial Insurance 0 / 0 [place]    1 [id number] 07/08/2024 07/08/2024 Mixed Amphetamine Salts (Tablet) 60.0 30 30 MG NA CECIL BENNETT [facility] Commercial Insurance 0 / 0 [place]    1 [id number] 06/07/2024 06/07/2024 Mixed Amphetamine Salts (Tablet) 60.0 30 30 MG NA DILSHAD ARENAS POGODZINSKI [facility] Commercial Insurance 0 / 0 [place]    1 [id number] 05/07/2024 05/07/2024 Mixed Amphetamine Salts (Tablet) 60.0 30 30 MG NA RADHAJHONY ESCOBAR [facility] Commercial Insurance 0 / 0 [place]    1 [id number] 04/15/2024 04/15/2024 Mixed Amphetamine Salts (Tablet) 30.0 30 20 MG NA RADHA SHAWN [facility] Commercial Insurance 0 / 0

## 2025-03-01 DIAGNOSIS — F98.8 ATTENTION DEFICIT DISORDER, UNSPECIFIED HYPERACTIVITY PRESENCE: ICD-10-CM

## 2025-03-03 RX ORDER — DEXTROAMPHETAMINE SACCHARATE, AMPHETAMINE ASPARTATE, DEXTROAMPHETAMINE SULFATE AND AMPHETAMINE SULFATE 7.5; 7.5; 7.5; 7.5 MG/1; MG/1; MG/1; MG/1
30 TABLET ORAL 2 TIMES DAILY
Qty: 60 TABLET | Refills: 0 | Status: SHIPPED | OUTPATIENT
Start: 2025-03-03

## 2025-03-03 NOTE — TELEPHONE ENCOUNTER
1 [id number] 01/30/2025 01/30/2025 Amphetamine Salt Combo (Tablet) 60.0 30 30 MG NA RADHA ESCOBAR [facility] Commercial Insurance 0 / 0 [place]    1 [id number] 12/30/2024 12/23/2024 Amphetamine Salt Combo (Tablet) 60.0 30 30 MG NA CECIL BENNETT [facility] Commercial Insurance 0 / 0 [place]    1 [id number] 12/02/2024 12/02/2024 Amphetamine Salt Combo (Tablet) 60.0 30 30 MG NA RADHA ESCOBAR [facility] Commercial Insurance 0 / 0 [place]    1 [id number] 10/28/2024 10/24/2024 Amphetamine Salt Combo (Tablet) 60.0 30 30 MG NA RADHA ESCOBAR [facility] Commercial Insurance 0 / 0 [place]    1 [id number] 09/30/2024 09/30/2024 Mixed Amphetamine Salts (Tablet) 60.0 30 30 MG NA CECIL BENNETT [facility] Commercial Insurance 0 / 0 [place]    1 [id number] 08/29/2024 08/29/2024 Mixed Amphetamine Salts (Tablet) 60.0 30 30 MG NA RADHA ESCOBAR [facility] Commercial Insurance 0 / 0 [place]    1 [id number] 08/01/2024 08/01/2024 Mixed Amphetamine Salts (Tablet) 60.0 30 30 MG NA RADHA ESCOBAR [facility] Commercial Insurance 0 / 0 [place]    1 [id number] 07/08/2024 07/08/2024 Mixed Amphetamine Salts (Tablet) 60.0 30 30 MG NA CECIL BENNETT [facility] Commercial Insurance 0 / 0 [place]    1 [id number] 06/07/2024 06/07/2024 Mixed Amphetamine Salts (Tablet) 60.0 30 30 MG NA DILSHAD ARENAS POGODZINSKI [facility] Commercial Insurance 0 / 0 [place]    1 [id number] 05/07/2024 05/07/2024 Mixed Amphetamine Salts (Tablet) 60.0 30 30 MG NA RADHA ESCOBAR [facility] Commercial Insurance 0 / 0 [place]

## 2025-03-25 DIAGNOSIS — F98.8 ATTENTION DEFICIT DISORDER, UNSPECIFIED HYPERACTIVITY PRESENCE: ICD-10-CM

## 2025-03-27 RX ORDER — DEXTROAMPHETAMINE SACCHARATE, AMPHETAMINE ASPARTATE, DEXTROAMPHETAMINE SULFATE AND AMPHETAMINE SULFATE 7.5; 7.5; 7.5; 7.5 MG/1; MG/1; MG/1; MG/1
30 TABLET ORAL 2 TIMES DAILY
Qty: 60 TABLET | Refills: 0 | Status: SHIPPED | OUTPATIENT
Start: 2025-03-27

## 2025-03-27 NOTE — TELEPHONE ENCOUNTER
1 [id number] 03/03/2025 03/03/2025 Amphetamine Salt Combo (Tablet) 60.0 30 30 MG NA RADHA ESCOBAR [facility] Commercial Insurance 0 / 0 [place]    1 [id number] 01/30/2025 01/30/2025 Amphetamine Salt Combo (Tablet) 60.0 30 30 MG NA RADHA ESCOBAR [facility] Commercial Insurance 0 / 0 [place]    1 [id number] 12/30/2024 12/23/2024 Amphetamine Salt Combo (Tablet) 60.0 30 30 MG NA CECIL BENNETT [facility] Commercial Insurance 0 / 0 [place]    1 [id number] 12/02/2024 12/02/2024 Amphetamine Salt Combo (Tablet) 60.0 30 30 MG NA RADHA ESCOBAR [facility] Commercial Insurance 0 / 0 [place]    1 [id number] 10/28/2024 10/24/2024 Amphetamine Salt Combo (Tablet) 60.0 30 30 MG NA RADHA ESCOBAR [facility] Commercial Insurance 0 / 0 [place]    1 [id number] 09/30/2024 09/30/2024 Mixed Amphetamine Salts (Tablet) 60.0 30 30 MG NA CECIL BENNETT [facility] Commercial Insurance 0 / 0 [place]    1 [id number] 08/29/2024 08/29/2024 Mixed Amphetamine Salts (Tablet) 60.0 30 30 MG NA RADHA ESCOBAR [facility] Commercial Insurance 0 / 0 [place]    1 [id number] 08/01/2024 08/01/2024 Mixed Amphetamine Salts (Tablet) 60.0 30 30 MG NA RADHA ESCOBAR [facility] Commercial Insurance 0 / 0 [place]    1 [id number] 07/08/2024 07/08/2024 Mixed Amphetamine Salts (Tablet) 60.0 30 30 MG NA CECIL BENNETT [facility] Commercial Insurance 0 / 0 [place]    1 [id number] 06/07/2024 06/07/2024 Mixed Amphetamine Salts (Tablet) 60.0 30 30 MG NA DILSHAD ARENAS POGODZINSKI [facility] Commercial Insurance 0 / 0 [place]

## 2025-04-28 DIAGNOSIS — F98.8 ATTENTION DEFICIT DISORDER, UNSPECIFIED HYPERACTIVITY PRESENCE: ICD-10-CM

## 2025-04-28 RX ORDER — DEXTROAMPHETAMINE SACCHARATE, AMPHETAMINE ASPARTATE, DEXTROAMPHETAMINE SULFATE AND AMPHETAMINE SULFATE 7.5; 7.5; 7.5; 7.5 MG/1; MG/1; MG/1; MG/1
30 TABLET ORAL 2 TIMES DAILY
Qty: 60 TABLET | Refills: 0 | Status: SHIPPED | OUTPATIENT
Start: 2025-04-28

## 2025-04-28 NOTE — TELEPHONE ENCOUNTER
1 [id number] 03/27/2025 03/27/2025 Amphetamine Salt Combo (Tablet) 60.0 30 30 MG NA RADHA BROADT [facility] Commercial Insurance 0 / 0 [place]    1 [id number] 03/03/2025 03/03/2025 Amphetamine Salt Combo (Tablet) 60.0 30 30 MG NA RADHA BROADT [facility] Commercial Insurance 0 / 0 [place]    1 [id number] 01/30/2025 01/30/2025 Amphetamine Salt Combo (Tablet) 60.0 30 30 MG NA RADHA ESCOBAR [facility] [place] [facility] Commercial Insurance 0 / 0 [place]    1 [id number] 12/30/2024 12/23/2024 Amphetamine Salt Combo (Tablet) 60.0 30 30 MG NA CECIL BENNETT [facility] [place] [facility] Commercial Insurance 0 / 0 [place]    1 [id number] 12/02/2024 12/02/2024 Amphetamine Salt Combo (Tablet) 60.0 30 30 MG NA RADHA ESCOBAR [facility] [place] [facility] Commercial Insurance 0 / 0 [place]    1 [id number] 10/28/2024 10/24/2024 Amphetamine Salt Combo (Tablet) 60.0 30 30 MG NA RADHA ESCOBAR [facility] [place] [facility] Commercial Insurance 0 / 0 [place]    1 [id number] 09/30/2024 09/30/2024 Mixed Amphetamine Salts (Tablet) 60.0 30 30 MG NA CECIL BENNETT [facility] [place] [facility] Commercial Insurance 0 / 0 [place]    1 [id number] 08/29/2024 08/29/2024 Mixed Amphetamine Salts (Tablet) 60.0 30 30 MG NA RADHA ESCOBAR [facility] [place] [facility] Commercial Insurance 0 / 0 [place]    1 [id number] 08/01/2024 08/01/2024 Mixed Amphetamine Salts (Tablet) 60.0 30 30 MG NA RADHA ESCOBAR [facility] [place] [facility] Commercial Insurance 0 / 0 [place]    1 [id number] 07/08/2024 07/08/2024 Mixed Amphetamine Salts (Tablet) 60.0 30 30 MG NA CECIL BENNETT [facility] [place] [facility] Commercial Insurance 0 / 0 [place]

## 2025-05-27 DIAGNOSIS — F98.8 ATTENTION DEFICIT DISORDER, UNSPECIFIED HYPERACTIVITY PRESENCE: ICD-10-CM

## 2025-05-28 RX ORDER — DEXTROAMPHETAMINE SACCHARATE, AMPHETAMINE ASPARTATE, DEXTROAMPHETAMINE SULFATE AND AMPHETAMINE SULFATE 7.5; 7.5; 7.5; 7.5 MG/1; MG/1; MG/1; MG/1
30 TABLET ORAL 2 TIMES DAILY
Qty: 60 TABLET | Refills: 0 | Status: SHIPPED | OUTPATIENT
Start: 2025-05-28

## 2025-07-30 ENCOUNTER — OFFICE VISIT (OUTPATIENT)
Dept: FAMILY MEDICINE CLINIC | Facility: CLINIC | Age: 30
End: 2025-07-30
Payer: COMMERCIAL

## 2025-07-30 ENCOUNTER — HOSPITAL ENCOUNTER (OUTPATIENT)
Dept: RADIOLOGY | Age: 30
Discharge: HOME | End: 2025-07-30
Attending: FAMILY MEDICINE
Payer: COMMERCIAL

## 2025-07-30 DIAGNOSIS — K52.9 COLITIS: Primary | ICD-10-CM

## 2025-07-30 DIAGNOSIS — R10.30 LOWER ABDOMINAL PAIN: ICD-10-CM

## 2025-07-30 PROCEDURE — 74177 CT ABD & PELVIS W/CONTRAST: CPT

## 2025-07-30 RX ORDER — CIPROFLOXACIN 500 MG/1
500 TABLET, FILM COATED ORAL EVERY 12 HOURS SCHEDULED
Qty: 20 TABLET | Refills: 0 | Status: SHIPPED | OUTPATIENT
Start: 2025-07-30 | End: 2025-08-09

## 2025-07-30 RX ORDER — METRONIDAZOLE 500 MG/1
500 TABLET ORAL EVERY 12 HOURS SCHEDULED
Qty: 20 TABLET | Refills: 0 | Status: SHIPPED | OUTPATIENT
Start: 2025-07-30 | End: 2025-08-09

## 2025-07-30 RX ORDER — CHOLESTYRAMINE 4 G/9G
1 POWDER, FOR SUSPENSION ORAL 2 TIMES DAILY WITH MEALS
Qty: 20 PACKET | Refills: 0 | Status: SHIPPED | OUTPATIENT
Start: 2025-07-30 | End: 2025-08-09

## 2025-07-30 RX ADMIN — IOHEXOL 100 ML: 350 INJECTION, SOLUTION INTRAVENOUS at 11:57

## 2025-07-31 ENCOUNTER — TELEPHONE (OUTPATIENT)
Age: 30
End: 2025-07-31

## 2025-07-31 ENCOUNTER — HOSPITAL ENCOUNTER (EMERGENCY)
Facility: HOSPITAL | Age: 30
Discharge: HOME | End: 2025-08-01
Attending: EMERGENCY MEDICINE | Admitting: EMERGENCY MEDICINE
Payer: COMMERCIAL

## 2025-07-31 VITALS
BODY MASS INDEX: 61.28 KG/M2 | WEIGHT: 315 LBS | RESPIRATION RATE: 16 BRPM | SYSTOLIC BLOOD PRESSURE: 146 MMHG | HEART RATE: 82 BPM | OXYGEN SATURATION: 97 % | TEMPERATURE: 98.5 F | DIASTOLIC BLOOD PRESSURE: 67 MMHG

## 2025-07-31 DIAGNOSIS — K52.9 COLITIS: Primary | ICD-10-CM

## 2025-07-31 LAB
ALBUMIN SERPL BCG-MCNC: 4.2 G/DL (ref 3.5–5)
ALP SERPL-CCNC: 49 U/L (ref 34–104)
ALT SERPL W P-5'-P-CCNC: 58 U/L (ref 7–52)
ANION GAP SERPL CALCULATED.3IONS-SCNC: 9 MMOL/L (ref 4–13)
AST SERPL W P-5'-P-CCNC: 37 U/L (ref 13–39)
BASOPHILS # BLD AUTO: 0.07 THOUSANDS/ÂΜL (ref 0–0.1)
BASOPHILS NFR BLD AUTO: 1 % (ref 0–1)
BILIRUB SERPL-MCNC: 0.52 MG/DL (ref 0.2–1)
BUN SERPL-MCNC: 10 MG/DL (ref 5–25)
CALCIUM SERPL-MCNC: 8.8 MG/DL (ref 8.4–10.2)
CHLORIDE SERPL-SCNC: 102 MMOL/L (ref 96–108)
CO2 SERPL-SCNC: 27 MMOL/L (ref 21–32)
CREAT SERPL-MCNC: 1.14 MG/DL (ref 0.6–1.3)
EOSINOPHIL # BLD AUTO: 0.01 THOUSAND/ÂΜL (ref 0–0.61)
EOSINOPHIL NFR BLD AUTO: 0 % (ref 0–6)
ERYTHROCYTE [DISTWIDTH] IN BLOOD BY AUTOMATED COUNT: 13.2 % (ref 11.6–15.1)
GFR SERPL CREATININE-BSD FRML MDRD: 87 ML/MIN/1.73SQ M
GLUCOSE SERPL-MCNC: 134 MG/DL (ref 65–140)
HCT VFR BLD AUTO: 40.2 % (ref 36.5–49.3)
HGB BLD-MCNC: 13.8 G/DL (ref 12–17)
IMM GRANULOCYTES # BLD AUTO: 0.06 THOUSAND/UL (ref 0–0.2)
IMM GRANULOCYTES NFR BLD AUTO: 1 % (ref 0–2)
LACTATE SERPL-SCNC: 1.8 MMOL/L (ref 0.5–2)
LIPASE SERPL-CCNC: 19 U/L (ref 11–82)
LYMPHOCYTES # BLD AUTO: 1.33 THOUSANDS/ÂΜL (ref 0.6–4.47)
LYMPHOCYTES NFR BLD AUTO: 22 % (ref 14–44)
MCH RBC QN AUTO: 30.7 PG (ref 26.8–34.3)
MCHC RBC AUTO-ENTMCNC: 34.3 G/DL (ref 31.4–37.4)
MCV RBC AUTO: 89 FL (ref 82–98)
MONOCYTES # BLD AUTO: 0.66 THOUSAND/ÂΜL (ref 0.17–1.22)
MONOCYTES NFR BLD AUTO: 11 % (ref 4–12)
NEUTROPHILS # BLD AUTO: 3.83 THOUSANDS/ÂΜL (ref 1.85–7.62)
NEUTS SEG NFR BLD AUTO: 65 % (ref 43–75)
NRBC BLD AUTO-RTO: 0 /100 WBCS
PLATELET # BLD AUTO: 265 THOUSANDS/UL (ref 149–390)
PMV BLD AUTO: 10.7 FL (ref 8.9–12.7)
POTASSIUM SERPL-SCNC: 3.4 MMOL/L (ref 3.5–5.3)
PROT SERPL-MCNC: 7 G/DL (ref 6.4–8.4)
RBC # BLD AUTO: 4.5 MILLION/UL (ref 3.88–5.62)
SODIUM SERPL-SCNC: 138 MMOL/L (ref 135–147)
WBC # BLD AUTO: 5.96 THOUSAND/UL (ref 4.31–10.16)

## 2025-07-31 PROCEDURE — 83690 ASSAY OF LIPASE: CPT

## 2025-07-31 PROCEDURE — 36415 COLL VENOUS BLD VENIPUNCTURE: CPT

## 2025-07-31 PROCEDURE — 96361 HYDRATE IV INFUSION ADD-ON: CPT

## 2025-07-31 PROCEDURE — 96375 TX/PRO/DX INJ NEW DRUG ADDON: CPT

## 2025-07-31 PROCEDURE — 99284 EMERGENCY DEPT VISIT MOD MDM: CPT

## 2025-07-31 PROCEDURE — 85025 COMPLETE CBC W/AUTO DIFF WBC: CPT

## 2025-07-31 PROCEDURE — 80053 COMPREHEN METABOLIC PANEL: CPT

## 2025-07-31 PROCEDURE — 83605 ASSAY OF LACTIC ACID: CPT

## 2025-07-31 PROCEDURE — 99283 EMERGENCY DEPT VISIT LOW MDM: CPT

## 2025-07-31 PROCEDURE — 96374 THER/PROPH/DIAG INJ IV PUSH: CPT

## 2025-07-31 RX ORDER — FAMOTIDINE 10 MG/ML
20 INJECTION, SOLUTION INTRAVENOUS ONCE
Status: COMPLETED | OUTPATIENT
Start: 2025-07-31 | End: 2025-07-31

## 2025-07-31 RX ORDER — ONDANSETRON 4 MG/1
4 TABLET, FILM COATED ORAL EVERY 6 HOURS
Qty: 12 TABLET | Refills: 0 | Status: SHIPPED | OUTPATIENT
Start: 2025-07-31

## 2025-07-31 RX ORDER — PANTOPRAZOLE SODIUM 40 MG/1
40 TABLET, DELAYED RELEASE ORAL DAILY
Qty: 30 TABLET | Refills: 0 | Status: SHIPPED | OUTPATIENT
Start: 2025-07-31

## 2025-07-31 RX ORDER — POTASSIUM CHLORIDE 1500 MG/1
20 TABLET, EXTENDED RELEASE ORAL ONCE
Status: COMPLETED | OUTPATIENT
Start: 2025-07-31 | End: 2025-08-01

## 2025-07-31 RX ORDER — KETOROLAC TROMETHAMINE 30 MG/ML
15 INJECTION, SOLUTION INTRAMUSCULAR; INTRAVENOUS ONCE
Status: COMPLETED | OUTPATIENT
Start: 2025-07-31 | End: 2025-07-31

## 2025-07-31 RX ADMIN — FAMOTIDINE 20 MG: 10 INJECTION INTRAVENOUS at 21:29

## 2025-07-31 RX ADMIN — KETOROLAC TROMETHAMINE 15 MG: 30 INJECTION, SOLUTION INTRAMUSCULAR; INTRAVENOUS at 21:29

## 2025-07-31 RX ADMIN — SODIUM CHLORIDE 1000 ML: 0.9 INJECTION, SOLUTION INTRAVENOUS at 21:30

## 2025-08-01 ENCOUNTER — TELEPHONE (OUTPATIENT)
Age: 30
End: 2025-08-01

## 2025-08-01 RX ADMIN — POTASSIUM CHLORIDE 20 MEQ: 1500 TABLET, EXTENDED RELEASE ORAL at 00:09

## 2025-08-04 ENCOUNTER — PATIENT MESSAGE (OUTPATIENT)
Dept: FAMILY MEDICINE CLINIC | Facility: CLINIC | Age: 30
End: 2025-08-04

## 2025-08-04 DIAGNOSIS — K52.9 COLITIS: Primary | ICD-10-CM

## 2025-08-22 ENCOUNTER — HOSPITAL ENCOUNTER (OUTPATIENT)
Dept: RADIOLOGY | Age: 30
Discharge: HOME | End: 2025-08-22
Attending: FAMILY MEDICINE
Payer: COMMERCIAL

## 2025-08-22 DIAGNOSIS — K52.9 COLITIS: ICD-10-CM

## 2025-08-22 PROCEDURE — 74177 CT ABD & PELVIS W/CONTRAST: CPT

## 2025-08-22 RX ADMIN — IOHEXOL 100 ML: 350 INJECTION, SOLUTION INTRAVENOUS at 07:52
